# Patient Record
Sex: MALE | Race: OTHER | NOT HISPANIC OR LATINO | URBAN - METROPOLITAN AREA
[De-identification: names, ages, dates, MRNs, and addresses within clinical notes are randomized per-mention and may not be internally consistent; named-entity substitution may affect disease eponyms.]

---

## 2017-02-27 ENCOUNTER — EMERGENCY (EMERGENCY)
Facility: HOSPITAL | Age: 62
LOS: 1 days | Discharge: HOME | End: 2017-02-27
Attending: EMERGENCY MEDICINE
Payer: COMMERCIAL

## 2017-02-27 VITALS
TEMPERATURE: 98 F | WEIGHT: 125 LBS | OXYGEN SATURATION: 96 % | DIASTOLIC BLOOD PRESSURE: 81 MMHG | RESPIRATION RATE: 24 BRPM | HEIGHT: 72 IN | HEART RATE: 83 BPM | SYSTOLIC BLOOD PRESSURE: 122 MMHG

## 2017-02-27 DIAGNOSIS — R51 HEADACHE: ICD-10-CM
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
## 2017-02-27 LAB
ALBUMIN SERPL ELPH-MCNC: 4.5 G/DL — SIGNIFICANT CHANGE UP (ref 3.3–5.2)
ALP SERPL-CCNC: 72 U/L — SIGNIFICANT CHANGE UP (ref 40–120)
ALT FLD-CCNC: 22 U/L — SIGNIFICANT CHANGE UP
ANION GAP SERPL CALC-SCNC: 13 MMOL/L — SIGNIFICANT CHANGE UP (ref 5–17)
APAP SERPL-MCNC: <7.5 UG/ML — LOW (ref 10–26)
APTT BLD: 32.8 SEC — SIGNIFICANT CHANGE UP (ref 27.5–37.4)
AST SERPL-CCNC: 21 U/L — SIGNIFICANT CHANGE UP
BASOPHILS # BLD AUTO: 0 K/UL — SIGNIFICANT CHANGE UP (ref 0–0.2)
BASOPHILS NFR BLD AUTO: 0.2 % — SIGNIFICANT CHANGE UP (ref 0–2)
BILIRUB SERPL-MCNC: 0.3 MG/DL — LOW (ref 0.4–2)
BLD GP AB SCN SERPL QL: SIGNIFICANT CHANGE UP
BUN SERPL-MCNC: 18 MG/DL — SIGNIFICANT CHANGE UP (ref 8–20)
CALCIUM SERPL-MCNC: 9.5 MG/DL — SIGNIFICANT CHANGE UP (ref 8.6–10.2)
CHLORIDE SERPL-SCNC: 104 MMOL/L — SIGNIFICANT CHANGE UP (ref 98–107)
CO2 SERPL-SCNC: 25 MMOL/L — SIGNIFICANT CHANGE UP (ref 22–29)
CREAT SERPL-MCNC: 0.92 MG/DL — SIGNIFICANT CHANGE UP (ref 0.5–1.3)
EOSINOPHIL # BLD AUTO: 0 K/UL — SIGNIFICANT CHANGE UP (ref 0–0.5)
EOSINOPHIL NFR BLD AUTO: 0.6 % — SIGNIFICANT CHANGE UP (ref 0–6)
ETHANOL SERPL-MCNC: <10 MG/DL — SIGNIFICANT CHANGE UP
GLUCOSE SERPL-MCNC: 142 MG/DL — HIGH (ref 70–115)
HCT VFR BLD CALC: 40 % — LOW (ref 42–52)
HGB BLD-MCNC: 14 G/DL — SIGNIFICANT CHANGE UP (ref 14–18)
INR BLD: 1.04 RATIO — SIGNIFICANT CHANGE UP (ref 0.88–1.16)
LYMPHOCYTES # BLD AUTO: 1.6 K/UL — SIGNIFICANT CHANGE UP (ref 1–4.8)
LYMPHOCYTES # BLD AUTO: 20.1 % — SIGNIFICANT CHANGE UP (ref 20–55)
MCHC RBC-ENTMCNC: 32.6 PG — HIGH (ref 27–31)
MCHC RBC-ENTMCNC: 35 G/DL — SIGNIFICANT CHANGE UP (ref 32–36)
MCV RBC AUTO: 93 FL — SIGNIFICANT CHANGE UP (ref 80–94)
MONOCYTES # BLD AUTO: 0.4 K/UL — SIGNIFICANT CHANGE UP (ref 0–0.8)
MONOCYTES NFR BLD AUTO: 5.3 % — SIGNIFICANT CHANGE UP (ref 3–10)
NEUTROPHILS # BLD AUTO: 5.9 K/UL — SIGNIFICANT CHANGE UP (ref 1.8–8)
NEUTROPHILS NFR BLD AUTO: 73.4 % — HIGH (ref 37–73)
PLATELET # BLD AUTO: 150 K/UL — SIGNIFICANT CHANGE UP (ref 150–400)
POTASSIUM SERPL-MCNC: 3.6 MMOL/L — SIGNIFICANT CHANGE UP (ref 3.5–5.3)
POTASSIUM SERPL-SCNC: 3.6 MMOL/L — SIGNIFICANT CHANGE UP (ref 3.5–5.3)
PROT SERPL-MCNC: 7.3 G/DL — SIGNIFICANT CHANGE UP (ref 6.6–8.7)
PROTHROM AB SERPL-ACNC: 11.4 SEC — SIGNIFICANT CHANGE UP (ref 10–13.1)
RBC # BLD: 4.3 M/UL — LOW (ref 4.6–6.2)
RBC # FLD: 15 % — SIGNIFICANT CHANGE UP (ref 11–15.6)
SALICYLATES SERPL-MCNC: <2 MG/DL — LOW (ref 10–20)
SODIUM SERPL-SCNC: 142 MMOL/L — SIGNIFICANT CHANGE UP (ref 135–145)
TYPE + AB SCN PNL BLD: SIGNIFICANT CHANGE UP
WBC # BLD: 8.1 K/UL — SIGNIFICANT CHANGE UP (ref 4.8–10.8)
WBC # FLD AUTO: 8.1 K/UL — SIGNIFICANT CHANGE UP (ref 4.8–10.8)

## 2017-02-27 PROCEDURE — 85730 THROMBOPLASTIN TIME PARTIAL: CPT

## 2017-02-27 PROCEDURE — 80307 DRUG TEST PRSMV CHEM ANLYZR: CPT

## 2017-02-27 PROCEDURE — 86901 BLOOD TYPING SEROLOGIC RH(D): CPT

## 2017-02-27 PROCEDURE — 71260 CT THORAX DX C+: CPT | Mod: 26

## 2017-02-27 PROCEDURE — 72125 CT NECK SPINE W/O DYE: CPT

## 2017-02-27 PROCEDURE — 99284 EMERGENCY DEPT VISIT MOD MDM: CPT | Mod: 25

## 2017-02-27 PROCEDURE — 74177 CT ABD & PELVIS W/CONTRAST: CPT

## 2017-02-27 PROCEDURE — 86850 RBC ANTIBODY SCREEN: CPT

## 2017-02-27 PROCEDURE — 85027 COMPLETE CBC AUTOMATED: CPT

## 2017-02-27 PROCEDURE — 86900 BLOOD TYPING SEROLOGIC ABO: CPT

## 2017-02-27 PROCEDURE — 72125 CT NECK SPINE W/O DYE: CPT | Mod: 26

## 2017-02-27 PROCEDURE — 70450 CT HEAD/BRAIN W/O DYE: CPT | Mod: 26

## 2017-02-27 PROCEDURE — 85610 PROTHROMBIN TIME: CPT

## 2017-02-27 PROCEDURE — 99284 EMERGENCY DEPT VISIT MOD MDM: CPT

## 2017-02-27 PROCEDURE — 80053 COMPREHEN METABOLIC PANEL: CPT

## 2017-02-27 PROCEDURE — 74177 CT ABD & PELVIS W/CONTRAST: CPT | Mod: 26

## 2017-02-27 PROCEDURE — 70450 CT HEAD/BRAIN W/O DYE: CPT

## 2017-02-27 PROCEDURE — 71260 CT THORAX DX C+: CPT

## 2017-02-27 NOTE — ED STATDOCS - NS ED MD SCRIBE ATTENDING SCRIBE SECTIONS
DISPOSITION/HISTORY OF PRESENT ILLNESS/REVIEW OF SYSTEMS/VITAL SIGNS( Pullset)/PHYSICAL EXAM/PAST MEDICAL/SURGICAL/SOCIAL HISTORY/HIV
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
## 2017-02-27 NOTE — ED STATDOCS - MUSCULOSKELETAL, MLM
range of motion is not limited and there is no muscle tenderness. Mild swelling of R patella area, able to ambulate
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
## 2017-02-27 NOTE — ED PROVIDER NOTE - MEDICAL DECISION MAKING DETAILS
brother picking pt up his agitation is improved brother states he is always like this return to ed for intractable HA persitent vomiting or new onset motor or senory deficits. pt aware of need for f/u ct abdomen incidental findigns
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
## 2017-02-27 NOTE — ED PROVIDER NOTE - CONSTITUTIONAL, MLM
normal... anxious, awake, alert, oriented to person, place, time/situation and in no apparent distress.
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
## 2017-02-27 NOTE — ED PROVIDER NOTE - PHYSICAL EXAMINATION
neuro: CN II - XII intact, EOMI, PERRL, no papilledema, 5/5 muscle strength x 4 extremities, no sensory deficits, 2+ dtr globally, negative babinski, no ataxic gait, normal RACHEL and FNT, normal romberg
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
## 2020-01-13 NOTE — ED STATDOCS - ATTESTATION, MLM
Faxed to [facility] at [phone] with confirmation. I have reviewed and confirmed nurses' notes for patient's medications, allergies, medical history, and surgical history.

## 2020-05-19 PROBLEM — Z00.00 ENCOUNTER FOR PREVENTIVE HEALTH EXAMINATION: Status: ACTIVE | Noted: 2020-05-19

## 2020-05-21 ENCOUNTER — APPOINTMENT (OUTPATIENT)
Dept: UROLOGY | Facility: CLINIC | Age: 65
End: 2020-05-21
Payer: MEDICARE

## 2020-05-21 VITALS
SYSTOLIC BLOOD PRESSURE: 125 MMHG | TEMPERATURE: 97.7 F | HEIGHT: 72 IN | WEIGHT: 130 LBS | DIASTOLIC BLOOD PRESSURE: 84 MMHG | BODY MASS INDEX: 17.61 KG/M2 | HEART RATE: 85 BPM

## 2020-05-21 DIAGNOSIS — A63.0 ANOGENITAL (VENEREAL) WARTS: ICD-10-CM

## 2020-05-21 LAB
BILIRUB UR QL STRIP: NORMAL
CLARITY UR: CLEAR
COLLECTION METHOD: NORMAL
GLUCOSE UR-MCNC: NORMAL
HCG UR QL: 0.2 EU/DL
HGB UR QL STRIP.AUTO: NORMAL
KETONES UR-MCNC: NORMAL
LEUKOCYTE ESTERASE UR QL STRIP: NORMAL
NITRITE UR QL STRIP: NORMAL
PH UR STRIP: 5.5
PROT UR STRIP-MCNC: NORMAL
SP GR UR STRIP: 1.01

## 2020-05-21 PROCEDURE — 81003 URINALYSIS AUTO W/O SCOPE: CPT | Mod: QW

## 2020-05-21 PROCEDURE — 99202 OFFICE O/P NEW SF 15 MIN: CPT | Mod: 25

## 2020-05-21 NOTE — REVIEW OF SYSTEMS
[Shortness Of Breath] : shortness of breath [Difficulty Breathing] : dyspnea [Negative] : Heme/Lymph

## 2020-05-26 NOTE — HISTORY OF PRESENT ILLNESS
[FreeTextEntry1] : referred for lesion on penis.  Voids well, Has for "long time" . Not sexually active at present. [None] : no symptoms

## 2020-05-26 NOTE — PHYSICAL EXAM
[General Appearance - Well Nourished] : well nourished [General Appearance - Well Developed] : well developed [FreeTextEntry1] : small pedunculated lesion mid shaft lateral right [General Appearance - In No Acute Distress] : no acute distress [Oriented To Time, Place, And Person] : oriented to person, place, and time

## 2020-05-26 NOTE — ASSESSMENT
[FreeTextEntry1] : Condyloma accuminata, options discussed.  Will try condylox, instructions/ precautions reviewed.

## 2020-07-14 ENCOUNTER — APPOINTMENT (OUTPATIENT)
Dept: UROLOGY | Facility: CLINIC | Age: 65
End: 2020-07-14
Payer: MEDICARE

## 2020-07-14 VITALS — SYSTOLIC BLOOD PRESSURE: 109 MMHG | HEART RATE: 106 BPM | TEMPERATURE: 98 F | DIASTOLIC BLOOD PRESSURE: 74 MMHG

## 2020-07-14 PROCEDURE — 99213 OFFICE O/P EST LOW 20 MIN: CPT | Mod: 25

## 2020-07-14 PROCEDURE — 81003 URINALYSIS AUTO W/O SCOPE: CPT | Mod: QW

## 2020-07-14 NOTE — PHYSICAL EXAM
[General Appearance - Well Developed] : well developed [Normal Appearance] : normal appearance [Well Groomed] : well groomed [General Appearance - Well Nourished] : well nourished [General Appearance - In No Acute Distress] : no acute distress [Urethral Meatus] : meatus normal [Skin Color & Pigmentation] : normal skin color and pigmentation [] : no rash [Affect] : the affect was normal [Mood] : the mood was normal [Oriented To Time, Place, And Person] : oriented to person, place, and time [FreeTextEntry1] : uses cane [Not Anxious] : not anxious [Normal Station and Gait] : the gait and station were normal for the patient's age

## 2020-07-16 LAB
BILIRUB UR QL STRIP: NORMAL
CLARITY UR: CLEAR
COLLECTION METHOD: NORMAL
GLUCOSE UR-MCNC: NORMAL
HCG UR QL: 0.2 EU/DL
HGB UR QL STRIP.AUTO: NORMAL
KETONES UR-MCNC: NORMAL
LEUKOCYTE ESTERASE UR QL STRIP: NORMAL
NITRITE UR QL STRIP: NORMAL
PH UR STRIP: 5.5
PROT UR STRIP-MCNC: NORMAL
SP GR UR STRIP: 1.02

## 2020-11-03 ENCOUNTER — EMERGENCY (EMERGENCY)
Facility: HOSPITAL | Age: 65
LOS: 1 days | Discharge: HOME | End: 2020-11-03
Attending: STUDENT IN AN ORGANIZED HEALTH CARE EDUCATION/TRAINING PROGRAM
Payer: MEDICARE

## 2020-11-03 VITALS
RESPIRATION RATE: 18 BRPM | OXYGEN SATURATION: 98 % | SYSTOLIC BLOOD PRESSURE: 152 MMHG | HEART RATE: 73 BPM | DIASTOLIC BLOOD PRESSURE: 100 MMHG

## 2020-11-03 VITALS
TEMPERATURE: 99 F | SYSTOLIC BLOOD PRESSURE: 142 MMHG | HEART RATE: 96 BPM | DIASTOLIC BLOOD PRESSURE: 90 MMHG | WEIGHT: 139.99 LBS | HEIGHT: 72 IN | OXYGEN SATURATION: 98 % | RESPIRATION RATE: 18 BRPM

## 2020-11-03 LAB
ALBUMIN SERPL ELPH-MCNC: 4.2 G/DL — SIGNIFICANT CHANGE UP (ref 3.3–5.2)
ALP SERPL-CCNC: 90 U/L — SIGNIFICANT CHANGE UP (ref 40–120)
ALT FLD-CCNC: 25 U/L — SIGNIFICANT CHANGE UP
ANION GAP SERPL CALC-SCNC: 11 MMOL/L — SIGNIFICANT CHANGE UP (ref 5–17)
AST SERPL-CCNC: 25 U/L — SIGNIFICANT CHANGE UP
BASOPHILS # BLD AUTO: 0 K/UL — SIGNIFICANT CHANGE UP (ref 0–0.2)
BASOPHILS NFR BLD AUTO: 0 % — SIGNIFICANT CHANGE UP (ref 0–2)
BILIRUB SERPL-MCNC: 0.3 MG/DL — LOW (ref 0.4–2)
BUN SERPL-MCNC: 19 MG/DL — SIGNIFICANT CHANGE UP (ref 8–20)
CALCIUM SERPL-MCNC: 9.5 MG/DL — SIGNIFICANT CHANGE UP (ref 8.6–10.2)
CHLORIDE SERPL-SCNC: 104 MMOL/L — SIGNIFICANT CHANGE UP (ref 98–107)
CO2 SERPL-SCNC: 27 MMOL/L — SIGNIFICANT CHANGE UP (ref 22–29)
CREAT SERPL-MCNC: 0.81 MG/DL — SIGNIFICANT CHANGE UP (ref 0.5–1.3)
EOSINOPHIL # BLD AUTO: 0.11 K/UL — SIGNIFICANT CHANGE UP (ref 0–0.5)
EOSINOPHIL NFR BLD AUTO: 1.8 % — SIGNIFICANT CHANGE UP (ref 0–6)
GLUCOSE SERPL-MCNC: 85 MG/DL — SIGNIFICANT CHANGE UP (ref 70–99)
HCT VFR BLD CALC: 42.7 % — SIGNIFICANT CHANGE UP (ref 39–50)
HGB BLD-MCNC: 14.3 G/DL — SIGNIFICANT CHANGE UP (ref 13–17)
LYMPHOCYTES # BLD AUTO: 1.79 K/UL — SIGNIFICANT CHANGE UP (ref 1–3.3)
LYMPHOCYTES # BLD AUTO: 28.3 % — SIGNIFICANT CHANGE UP (ref 13–44)
MAGNESIUM SERPL-MCNC: 2.2 MG/DL — SIGNIFICANT CHANGE UP (ref 1.6–2.6)
MCHC RBC-ENTMCNC: 31.5 PG — SIGNIFICANT CHANGE UP (ref 27–34)
MCHC RBC-ENTMCNC: 33.5 GM/DL — SIGNIFICANT CHANGE UP (ref 32–36)
MCV RBC AUTO: 94.1 FL — SIGNIFICANT CHANGE UP (ref 80–100)
MONOCYTES # BLD AUTO: 0.5 K/UL — SIGNIFICANT CHANGE UP (ref 0–0.9)
MONOCYTES NFR BLD AUTO: 8 % — SIGNIFICANT CHANGE UP (ref 2–14)
NEUTROPHILS # BLD AUTO: 3.63 K/UL — SIGNIFICANT CHANGE UP (ref 1.8–7.4)
NEUTROPHILS NFR BLD AUTO: 57.5 % — SIGNIFICANT CHANGE UP (ref 43–77)
NT-PROBNP SERPL-SCNC: 45 PG/ML — SIGNIFICANT CHANGE UP (ref 0–300)
PLATELET # BLD AUTO: 63 K/UL — LOW (ref 150–400)
POTASSIUM SERPL-MCNC: 4.4 MMOL/L — SIGNIFICANT CHANGE UP (ref 3.5–5.3)
POTASSIUM SERPL-SCNC: 4.4 MMOL/L — SIGNIFICANT CHANGE UP (ref 3.5–5.3)
PROT SERPL-MCNC: 6.6 G/DL — SIGNIFICANT CHANGE UP (ref 6.6–8.7)
RBC # BLD: 4.54 M/UL — SIGNIFICANT CHANGE UP (ref 4.2–5.8)
RBC # FLD: 13.5 % — SIGNIFICANT CHANGE UP (ref 10.3–14.5)
SARS-COV-2 RNA SPEC QL NAA+PROBE: SIGNIFICANT CHANGE UP
SODIUM SERPL-SCNC: 142 MMOL/L — SIGNIFICANT CHANGE UP (ref 135–145)
TROPONIN T SERPL-MCNC: <0.01 NG/ML — SIGNIFICANT CHANGE UP (ref 0–0.06)
WBC # BLD: 6.31 K/UL — SIGNIFICANT CHANGE UP (ref 3.8–10.5)
WBC # FLD AUTO: 6.31 K/UL — SIGNIFICANT CHANGE UP (ref 3.8–10.5)

## 2020-11-03 PROCEDURE — 71045 X-RAY EXAM CHEST 1 VIEW: CPT | Mod: 26

## 2020-11-03 PROCEDURE — 83735 ASSAY OF MAGNESIUM: CPT

## 2020-11-03 PROCEDURE — 36415 COLL VENOUS BLD VENIPUNCTURE: CPT

## 2020-11-03 PROCEDURE — 85025 COMPLETE CBC W/AUTO DIFF WBC: CPT

## 2020-11-03 PROCEDURE — 71045 X-RAY EXAM CHEST 1 VIEW: CPT

## 2020-11-03 PROCEDURE — 93010 ELECTROCARDIOGRAM REPORT: CPT

## 2020-11-03 PROCEDURE — 84484 ASSAY OF TROPONIN QUANT: CPT

## 2020-11-03 PROCEDURE — 99285 EMERGENCY DEPT VISIT HI MDM: CPT

## 2020-11-03 PROCEDURE — 96374 THER/PROPH/DIAG INJ IV PUSH: CPT

## 2020-11-03 PROCEDURE — U0003: CPT

## 2020-11-03 PROCEDURE — 83880 ASSAY OF NATRIURETIC PEPTIDE: CPT

## 2020-11-03 PROCEDURE — 80053 COMPREHEN METABOLIC PANEL: CPT

## 2020-11-03 PROCEDURE — 93005 ELECTROCARDIOGRAM TRACING: CPT

## 2020-11-03 PROCEDURE — 99284 EMERGENCY DEPT VISIT MOD MDM: CPT | Mod: 25

## 2020-11-03 RX ORDER — FAMOTIDINE 10 MG/ML
20 INJECTION INTRAVENOUS ONCE
Refills: 0 | Status: COMPLETED | OUTPATIENT
Start: 2020-11-03 | End: 2020-11-03

## 2020-11-03 RX ORDER — LIDOCAINE 4 G/100G
10 CREAM TOPICAL ONCE
Refills: 0 | Status: COMPLETED | OUTPATIENT
Start: 2020-11-03 | End: 2020-11-03

## 2020-11-03 RX ADMIN — LIDOCAINE 10 MILLILITER(S): 4 CREAM TOPICAL at 18:54

## 2020-11-03 RX ADMIN — FAMOTIDINE 20 MILLIGRAM(S): 10 INJECTION INTRAVENOUS at 18:54

## 2020-11-03 RX ADMIN — Medication 30 MILLILITER(S): at 18:54

## 2020-11-03 NOTE — ED PROVIDER NOTE - ATTENDING CONTRIBUTION TO CARE
65 YOM pmh COPD presents c/o chest pain this morning.  Pt states that he had a brief episode of substernal stabbing chest pain this morning that resolved. Denies f/c, n/v, headache, sob, abd pain, constipation. Denies cardiac disease. In the process of switching doctors as patient just moved here.  AP - heart score 3 pre troponin. abd soft nontender nondistended. likely close outpatient f/u with cardiology

## 2020-11-03 NOTE — ED ADULT NURSE NOTE - INTERVENTIONS DEFINITIONS
Non-slip footwear when patient is off stretcher/Physically safe environment: no spills, clutter or unnecessary equipment/Monitor for mental status changes and reorient to person, place, and time/Instruct patient to call for assistance/Stretcher in lowest position, wheels locked, appropriate side rails in place/Monitor gait and stability/Review medications for side effects contributing to fall risk/Reinforce activity limits and safety measures with patient and family

## 2020-11-03 NOTE — ED PROVIDER NOTE - NS ED ROS FT
CONSTITUTIONAL: No fevers, no chills  Eyes: No vision changes  Cardiovascular: +Chest pain  Respiratory: No SOB  Gastrointestinal: +nausea, no v/c/d, +abd pain  Genitourinary: no dysuria, no hematuria  SKIN: no rashes.  MSK: no weakness, no myalgias, no arthralgias  NEURO: no headache, no weakness, no numbness  PSYCHIATRIC: no SI/HI

## 2020-11-03 NOTE — ED PROVIDER NOTE - PATIENT PORTAL LINK FT
You can access the FollowMyHealth Patient Portal offered by [facility] by registering at the following website: [email or web address]. By joining [employer]’s FollowMyHealth portal, you will also be able to view your health information using other applications (apps) compatible with our system.

## 2020-11-03 NOTE — ED PROVIDER NOTE - NSFOLLOWUPINSTRUCTIONS_ED_ALL_ED_FT
-- Please follow up with your doctor(s) within the next 3 days, but seek medical attention sooner if your symptoms persist or worsen.  Please call tomorrow for an appointment.  If you cannot follow-up with your primary doctor please return to the ED for any urgent issues.    -- You were given a copy of your labs and imaging.  Please go over these with your doctor(s).     -- If you have any worsening of symptoms or any other concerns please see your doctor or return to the nearest emergency room immediately.    -- Please continue taking your home medications as directed.  Do not use alcohol when taking any medication (especially antibiotics, tylenol or other pain medication) unless you check with the doctor or pharmacist.    **************************************************************************************************************  Chest Pain    Chest pain can be caused by many different conditions which may or may not be dangerous. Causes include heartburn, lung infections, heart attack, blood clot in lungs, skin infections, strain or damage to muscle, cartilage, or bones, etc. In addition to a history and physical examination, an electrocardiogram (ECG) or other lab tests may have been performed to determine the cause of your chest pain. Follow up with your primary care provider or with a cardiologist as instructed.     SEEK IMMEDIATE MEDICAL CARE IF YOU HAVE ANY OF THE FOLLOWING SYMPTOMS: worsening chest pain, coughing up blood, unexplained back/neck/jaw pain, severe abdominal pain, dizziness or lightheadedness, fainting, shortness of breath, sweaty or clammy skin, vomiting, or racing heart beat. These symptoms may represent a serious problem that is an emergency. Do not wait to see if the symptoms will go away. Get medical help right away. Call 911 and do not drive yourself to the hospital.

## 2020-11-03 NOTE — ED PROVIDER NOTE - OBJECTIVE STATEMENT
Pt is a 66 y/o M w/PMHx COPD presents c/o chest pain.  Pt states that he had a brief episode of substernal stabbing chest pain this morning that resolved.  He went to vote, and had a return of the chest pain.  He has had this pain in the past with an unremarkable w/u.  Pt states he had some diarrhea that has resolved, but still has some epigastric discomfort with nausea.  Denies shortness of breath, fever, headache

## 2020-11-03 NOTE — ED PROVIDER NOTE - CARE PROVIDER_API CALL
Melecio Garibay  CARDIOVASCULAR DISEASE  [address], [place] [address]  Phone: [phone]  Fax: [phone]  Follow Up Time:

## 2020-11-05 PROBLEM — J44.9 CHRONIC OBSTRUCTIVE PULMONARY DISEASE, UNSPECIFIED: Chronic | Status: ACTIVE | Noted: 2020-11-03

## 2020-11-05 PROBLEM — M54.9 DORSALGIA, UNSPECIFIED: Chronic | Status: ACTIVE | Noted: 2020-11-03

## 2020-11-30 DIAGNOSIS — Z87.898 PERSONAL HISTORY OF OTHER SPECIFIED CONDITIONS: ICD-10-CM

## 2020-11-30 DIAGNOSIS — Z87.39 PERSONAL HISTORY OF OTHER DISEASES OF THE MUSCULOSKELETAL SYSTEM AND CONNECTIVE TISSUE: ICD-10-CM

## 2020-11-30 DIAGNOSIS — Z87.891 PERSONAL HISTORY OF NICOTINE DEPENDENCE: ICD-10-CM

## 2020-11-30 DIAGNOSIS — Z87.09 PERSONAL HISTORY OF OTHER DISEASES OF THE RESPIRATORY SYSTEM: ICD-10-CM

## 2020-12-01 ENCOUNTER — APPOINTMENT (OUTPATIENT)
Dept: CARDIOLOGY | Facility: CLINIC | Age: 65
End: 2020-12-01
Payer: MEDICARE

## 2020-12-01 VITALS — DIASTOLIC BLOOD PRESSURE: 74 MMHG | SYSTOLIC BLOOD PRESSURE: 111 MMHG

## 2020-12-01 VITALS
OXYGEN SATURATION: 96 % | SYSTOLIC BLOOD PRESSURE: 107 MMHG | DIASTOLIC BLOOD PRESSURE: 71 MMHG | HEART RATE: 86 BPM | TEMPERATURE: 98.5 F

## 2020-12-01 DIAGNOSIS — Z72.0 TOBACCO USE: ICD-10-CM

## 2020-12-01 PROCEDURE — 99203 OFFICE O/P NEW LOW 30 MIN: CPT

## 2020-12-01 PROCEDURE — 99072 ADDL SUPL MATRL&STAF TM PHE: CPT

## 2020-12-01 RX ORDER — PODOFILOX 5 MG/G
0.5 GEL TOPICAL TWICE DAILY
Qty: 1 | Refills: 1 | Status: DISCONTINUED | COMMUNITY
Start: 2020-05-21 | End: 2020-12-01

## 2020-12-01 RX ORDER — ALBUTEROL SULFATE 2.5 MG/3ML
(2.5 MG/3ML) SOLUTION RESPIRATORY (INHALATION)
Refills: 3 | Status: ACTIVE | COMMUNITY
Start: 2020-12-01

## 2020-12-01 RX ORDER — ASCORBIC ACID 500 MG
TABLET ORAL DAILY
Refills: 0 | Status: ACTIVE | COMMUNITY
Start: 2020-12-01

## 2020-12-01 RX ORDER — PODOFILOX 5 MG/ML
0.5 SOLUTION TOPICAL TWICE DAILY
Qty: 1 | Refills: 1 | Status: DISCONTINUED | COMMUNITY
Start: 2020-05-21 | End: 2020-12-01

## 2020-12-01 NOTE — HISTORY OF PRESENT ILLNESS
[FreeTextEntry1] : 66 yo man, single, no children, doesn’t work, retired used to work with disabled patients. Since 2017 has been C/O chest pain, intermittent, not effort related, pressure like, mid sternal, sharp, may last hours, does not radiate, not associated with SOB, palpitations or dizziness. Never effort related. Denies SOB, orthopnea or PND. Denies palpitations or ankle swelling.\par On 11/3/2020 he presented to [facility] with chest pain. Underwent tests, cardiac troponins and ECG were WNL and was D/[facility]ario [facility]to home. \par Hyperlipidemia not treated\par COPD treated with meds\par Underwent Rt knee surgery in 2014\par Smokes 5 cigs/day, smoked a pack/day until 2017\par Doesnt drink alcohol\par Denies the use of illicit drugs\par No family history of heart disease\par

## 2020-12-01 NOTE — PHYSICAL EXAM
[General Appearance - Well Developed] : well developed [Normal Appearance] : normal appearance [Well Groomed] : well groomed [General Appearance - Well Nourished] : well nourished [No Deformities] : no deformities [Normal Conjunctiva] : the conjunctiva exhibited no abnormalities [Normal Jugular Venous V Waves Present] : normal jugular venous V waves present [Heart Rate And Rhythm] : heart rate and rhythm were normal [Heart Sounds] : normal S1 and S2 [Murmurs] : no murmurs present [Arterial Pulses Normal] : the arterial pulses were normal [Edema] : no peripheral edema present [Veins - Varicosity Changes] : no varicosital changes were noted in the lower extremities [Respiration, Rhythm And Depth] : normal respiratory rhythm and effort [Exaggerated Use Of Accessory Muscles For Inspiration] : no accessory muscle use [Auscultation Breath Sounds / Voice Sounds] : lungs were clear to auscultation bilaterally [Chest Palpation] : palpation of the chest revealed no abnormalities [Lungs Percussion] : the lungs were normal to percussion [Bowel Sounds] : normal bowel sounds [Abdomen Soft] : soft [Abdomen Tenderness] : non-tender [Abdomen Mass (___ Cm)] : no abdominal mass palpated [Abdomen Hernia] : no hernia was discovered [Abnormal Walk] : normal gait [Nail Clubbing] : no clubbing of the fingernails [Cyanosis, Localized] : no localized cyanosis [Skin Color & Pigmentation] : normal skin color and pigmentation [Skin Turgor] : normal skin turgor [] : no rash [Oriented To Time, Place, And Person] : oriented to person, place, and time [Impaired Insight] : insight and judgment were intact [No Anxiety] : not feeling anxious [FreeTextEntry1] : Deferred for COVID

## 2020-12-01 NOTE — ASSESSMENT
[FreeTextEntry1] : ECG performed Nov 3, 2020 revealed a NSR, with normal AQRS, DC, QRS and QTc.\par

## 2020-12-01 NOTE — REVIEW OF SYSTEMS
[Chest Pain] : chest pain [Negative] : Heme/Lymph [Dyspnea on exertion] : not dyspnea during exertion [Chest  Pressure] : no chest pressure [Lower Ext Edema] : no extremity edema [Palpitations] : no palpitations

## 2020-12-01 NOTE — DISCUSSION/SUMMARY
[FreeTextEntry1] : Mr. JACOBO CRAMER is a 65 year male with atypical chest pain and recent visit to [facility] ED. Troponins and ECG were WNL. \par I have recommended to continue the same medications.\par We will perform a nuclear stress test and an echocardiogram to assess for ischemia, left ventricular and valvular function.\par Routine follow up in 1 month\par

## 2021-01-14 ENCOUNTER — APPOINTMENT (OUTPATIENT)
Dept: CARDIOLOGY | Facility: CLINIC | Age: 66
End: 2021-01-14
Payer: MEDICARE

## 2021-01-14 PROCEDURE — 99072 ADDL SUPL MATRL&STAF TM PHE: CPT

## 2021-01-14 PROCEDURE — 93306 TTE W/DOPPLER COMPLETE: CPT

## 2021-02-02 ENCOUNTER — APPOINTMENT (OUTPATIENT)
Dept: CARDIOLOGY | Facility: CLINIC | Age: 66
End: 2021-02-02

## 2021-03-04 ENCOUNTER — APPOINTMENT (OUTPATIENT)
Dept: CARDIOLOGY | Facility: CLINIC | Age: 66
End: 2021-03-04
Payer: MEDICARE

## 2021-03-04 PROCEDURE — 99072 ADDL SUPL MATRL&STAF TM PHE: CPT

## 2021-03-04 PROCEDURE — 93015 CV STRESS TEST SUPVJ I&R: CPT

## 2021-03-04 PROCEDURE — A9500: CPT

## 2021-03-04 PROCEDURE — 78452 HT MUSCLE IMAGE SPECT MULT: CPT

## 2021-03-10 ENCOUNTER — APPOINTMENT (OUTPATIENT)
Dept: CARDIOLOGY | Facility: CLINIC | Age: 66
End: 2021-03-10
Payer: MEDICARE

## 2021-03-10 VITALS
HEART RATE: 77 BPM | DIASTOLIC BLOOD PRESSURE: 86 MMHG | WEIGHT: 138 LBS | SYSTOLIC BLOOD PRESSURE: 128 MMHG | HEIGHT: 72 IN | OXYGEN SATURATION: 98 % | BODY MASS INDEX: 18.69 KG/M2 | TEMPERATURE: 98.1 F

## 2021-03-10 DIAGNOSIS — K21.9 GASTRO-ESOPHAGEAL REFLUX DISEASE W/OUT ESOPHAGITIS: ICD-10-CM

## 2021-03-10 PROCEDURE — 99212 OFFICE O/P EST SF 10 MIN: CPT

## 2021-03-10 PROCEDURE — 99072 ADDL SUPL MATRL&STAF TM PHE: CPT

## 2021-03-10 NOTE — PHYSICAL EXAM
[General Appearance - Well Developed] : well developed [Normal Appearance] : normal appearance [Well Groomed] : well groomed [General Appearance - Well Nourished] : well nourished [No Deformities] : no deformities [Normal Conjunctiva] : the conjunctiva exhibited no abnormalities [Normal Jugular Venous V Waves Present] : normal jugular venous V waves present [Respiration, Rhythm And Depth] : normal respiratory rhythm and effort [Exaggerated Use Of Accessory Muscles For Inspiration] : no accessory muscle use [Auscultation Breath Sounds / Voice Sounds] : lungs were clear to auscultation bilaterally [Chest Palpation] : palpation of the chest revealed no abnormalities [Lungs Percussion] : the lungs were normal to percussion [Heart Rate And Rhythm] : heart rate and rhythm were normal [Heart Sounds] : normal S1 and S2 [Murmurs] : no murmurs present [Arterial Pulses Normal] : the arterial pulses were normal [Edema] : no peripheral edema present [Veins - Varicosity Changes] : no varicosital changes were noted in the lower extremities [Bowel Sounds] : normal bowel sounds [Abdomen Soft] : soft [Abdomen Tenderness] : non-tender [Abdomen Mass (___ Cm)] : no abdominal mass palpated [Abdomen Hernia] : no hernia was discovered [Abnormal Walk] : normal gait [Nail Clubbing] : no clubbing of the fingernails [Cyanosis, Localized] : no localized cyanosis [Skin Color & Pigmentation] : normal skin color and pigmentation [Skin Turgor] : normal skin turgor [] : no rash [Oriented To Time, Place, And Person] : oriented to person, place, and time [Impaired Insight] : insight and judgment were intact [No Anxiety] : not feeling anxious [FreeTextEntry1] : Deferred for COVID

## 2021-03-10 NOTE — HISTORY OF PRESENT ILLNESS
[FreeTextEntry1] : 64 yo man, single, no children, doesn’t work, retired used to work with disabled patients. Since 2017 has been C/O chest pain, intermittent, not effort related, pressure like, mid sternal, sharp, may last hours, does not radiate, not associated with SOB, palpitations or dizziness. Never effort related. Denies SOB, orthopnea or PND. Denies palpitations or ankle swelling.\par On 11/3/2020 he presented to [facility] with chest pain. Underwent tests, cardiac troponins and ECG were WNL and was D/[facility] home. \par Since that visit he has been asymptomatic and at the present time he denies chest pain or SOB. \par Underwent nuclear stress test in 3/4/2021 that revealed normal perfusion and normal EF.\par Echocardiogram performed 1/14/2021 was also WNL. \par Hyperlipidemia not treated\par COPD treated with meds\par Underwent Rt knee surgery in 2014\par Smokes 5 cigs/day, smoked a pack/day until 2017\par Doesnt drink alcohol\par Denies the use of illicit drugs\par No family history of heart disease\par

## 2021-03-10 NOTE — REVIEW OF SYSTEMS
[Negative] : Heme/Lymph [Dyspnea on exertion] : not dyspnea during exertion [Chest  Pressure] : no chest pressure [Chest Pain] : no chest pain [Lower Ext Edema] : no extremity edema [Palpitations] : no palpitations

## 2021-03-10 NOTE — ASSESSMENT
[FreeTextEntry1] : ECG performed Nov 3, 2020 revealed a NSR, with normal AQRS, AL, QRS and QTc.\par

## 2021-03-10 NOTE — DISCUSSION/SUMMARY
[FreeTextEntry1] : Mr. JACOBO CRAMER is a 65 year male with atypical chest pain and recent visit to [facility] ED. Troponins and ECG were WNL. Echo and NST were WNL. \par Since then he has been asymptomatic.\par Routine follow up in 6 months\par

## 2021-09-15 ENCOUNTER — APPOINTMENT (OUTPATIENT)
Dept: CARDIOLOGY | Facility: CLINIC | Age: 66
End: 2021-09-15

## 2021-10-07 ENCOUNTER — OUTPATIENT (OUTPATIENT)
Dept: OUTPATIENT SERVICES | Facility: HOSPITAL | Age: 66
LOS: 1 days | Discharge: HOME | End: 2021-10-07

## 2021-10-07 DIAGNOSIS — D69.6 THROMBOCYTOPENIA, UNSPECIFIED: ICD-10-CM

## 2021-10-08 ENCOUNTER — RESULT REVIEW (OUTPATIENT)
Age: 66
End: 2021-10-08

## 2021-10-08 ENCOUNTER — APPOINTMENT (OUTPATIENT)
Dept: HEMATOLOGY ONCOLOGY | Facility: CLINIC | Age: 66
End: 2021-10-08
Payer: MEDICARE

## 2021-10-08 VITALS
HEART RATE: 81 BPM | HEIGHT: 72 IN | BODY MASS INDEX: 18.56 KG/M2 | OXYGEN SATURATION: 98 % | SYSTOLIC BLOOD PRESSURE: 113 MMHG | DIASTOLIC BLOOD PRESSURE: 82 MMHG | WEIGHT: 137.03 LBS

## 2021-10-08 DIAGNOSIS — F17.200 NICOTINE DEPENDENCE, UNSPECIFIED, UNCOMPLICATED: ICD-10-CM

## 2021-10-08 LAB
BASOPHILS # BLD AUTO: 0 K/UL — SIGNIFICANT CHANGE UP (ref 0–0.2)
BASOPHILS NFR BLD AUTO: 0.6 % — SIGNIFICANT CHANGE UP (ref 0–2)
EOSINOPHIL # BLD AUTO: 0.1 K/UL — SIGNIFICANT CHANGE UP (ref 0–0.5)
EOSINOPHIL NFR BLD AUTO: 0.9 % — SIGNIFICANT CHANGE UP (ref 0–6)
HCT VFR BLD CALC: 48.2 % — SIGNIFICANT CHANGE UP (ref 39–50)
HGB BLD-MCNC: 15.9 G/DL — SIGNIFICANT CHANGE UP (ref 13–17)
LYMPHOCYTES # BLD AUTO: 2 K/UL — SIGNIFICANT CHANGE UP (ref 1–3.3)
LYMPHOCYTES # BLD AUTO: 32 % — SIGNIFICANT CHANGE UP (ref 13–44)
MCHC RBC-ENTMCNC: 30.6 PG — SIGNIFICANT CHANGE UP (ref 27–34)
MCHC RBC-ENTMCNC: 32.9 G/DL — SIGNIFICANT CHANGE UP (ref 32–36)
MCV RBC AUTO: 93.1 FL — SIGNIFICANT CHANGE UP (ref 80–100)
MONOCYTES # BLD AUTO: 0.4 K/UL — SIGNIFICANT CHANGE UP (ref 0–0.9)
MONOCYTES NFR BLD AUTO: 6.8 % — SIGNIFICANT CHANGE UP (ref 2–14)
NEUTROPHILS # BLD AUTO: 3.8 K/UL — SIGNIFICANT CHANGE UP (ref 1.8–7.4)
NEUTROPHILS NFR BLD AUTO: 59.6 % — SIGNIFICANT CHANGE UP (ref 43–77)
PLATELET # BLD AUTO: 71 K/UL — LOW (ref 150–400)
RBC # BLD: 5.18 M/UL — SIGNIFICANT CHANGE UP (ref 4.2–5.8)
RBC # FLD: 12.6 % — SIGNIFICANT CHANGE UP (ref 10.3–14.5)
WBC # BLD: 6.3 K/UL — SIGNIFICANT CHANGE UP (ref 3.8–10.5)
WBC # FLD AUTO: 6.3 K/UL — SIGNIFICANT CHANGE UP (ref 3.8–10.5)

## 2021-10-08 PROCEDURE — 99203 OFFICE O/P NEW LOW 30 MIN: CPT

## 2021-10-08 PROCEDURE — 99406 BEHAV CHNG SMOKING 3-10 MIN: CPT

## 2021-10-08 NOTE — ASSESSMENT
[FreeTextEntry1] : 66 M here for evaluation of thrombocytopenia. Labs on 6/2021 showed low plt, increased liver enzyme.  Pt reports that his plts were low at least for the past 5 years ago. Never require treatment.\par 3/9/2021: plt 98, normal wbc, hgb,\par 5/6/2020; plt 130\par \par # Thrombocytopenia\par -likely ITP given the chronicity and normal WBC, Hb.\par -will r/o out causes: check hepatitis panel\par -PBS reviewed: normal RBC with appropriate central pallor, no schistocytes.Granulocytes, lymphocytes appear normal morphologically, no increased in immature cells, decreased platelets, no clumping \par -underlying hematologic cause is unlikely \par -if the above tests are normal, it is likely ITP and can monitor closely with q 3 months CBC \par -no need for intervention plt>50\par \par # Smoking Cessation\par -pt not ready to quit\par -counseled pt on reducing smoking \par \par \par Will call with results\par \par PCP Erwin Napier [phone]

## 2021-10-08 NOTE — HISTORY OF PRESENT ILLNESS
[de-identified] : 66 M here for evaluation of thrombocytopenia. Labs on 6/2021 showed low plt, increased liver enzyme.  Pt reports that his plts were low at least for the past 5 years ago. Never require treatment.\par 3/9/2021: plt 98, normal wbc, hgb,\par 5/6/2020; plt 130\par Pt is a current everday smoker, not ready to quit. Denies any hematologic disorder in the family. No easy bruising. Denies HA. CP, SOB, abd pain, constipation, diarrhea, melena, hematuria, dysuria. \par \par \par PCP Erwin Napier [phone]

## 2021-10-08 NOTE — CONSULT LETTER
[Dear  ___] : Dear  [unfilled], [Consult Letter:] : I had the pleasure of evaluating your patient, [unfilled]. [Consult Closing:] : Thank you very much for allowing me to participate in the care of this patient.  If you have any questions, please do not hesitate to contact me. [Sincerely,] : Sincerely, [FreeTextEntry3] : Arash Murphy

## 2021-10-13 LAB
FERRITIN SERPL-MCNC: 29 NG/ML
FOLATE SERPL-MCNC: >20 NG/ML
HAV IGM SER QL: NONREACTIVE
HBV CORE IGG+IGM SER QL: NONREACTIVE
HBV CORE IGM SER QL: NONREACTIVE
HBV SURFACE AB SER QL: NONREACTIVE
HBV SURFACE AG SER QL: NONREACTIVE
HCV AB SER QL: NONREACTIVE
HCV S/CO RATIO: 0.13 S/CO
IRON SATN MFR SERPL: 24 %
IRON SERPL-MCNC: 90 UG/DL
RBC # BLD: 5.02 M/UL
RETICS # AUTO: 1.1 %
RETICS AGGREG/RBC NFR: 55.2 K/UL
TIBC SERPL-MCNC: 370 UG/DL
UIBC SERPL-MCNC: 280 UG/DL
VIT B12 SERPL-MCNC: 655 PG/ML

## 2021-11-11 ENCOUNTER — OUTPATIENT (OUTPATIENT)
Dept: OUTPATIENT SERVICES | Facility: HOSPITAL | Age: 66
LOS: 1 days | Discharge: HOME | End: 2021-11-11

## 2021-11-11 DIAGNOSIS — D69.6 THROMBOCYTOPENIA, UNSPECIFIED: ICD-10-CM

## 2021-11-15 ENCOUNTER — RESULT REVIEW (OUTPATIENT)
Age: 66
End: 2021-11-15

## 2021-11-15 ENCOUNTER — APPOINTMENT (OUTPATIENT)
Dept: HEMATOLOGY ONCOLOGY | Facility: CLINIC | Age: 66
End: 2021-11-15
Payer: MEDICARE

## 2021-11-15 VITALS
OXYGEN SATURATION: 98 % | WEIGHT: 142.01 LBS | HEIGHT: 72 IN | DIASTOLIC BLOOD PRESSURE: 82 MMHG | BODY MASS INDEX: 19.23 KG/M2 | HEART RATE: 83 BPM | SYSTOLIC BLOOD PRESSURE: 117 MMHG

## 2021-11-15 LAB
BASOPHILS # BLD AUTO: 0 K/UL — SIGNIFICANT CHANGE UP (ref 0–0.2)
BASOPHILS NFR BLD AUTO: 0.8 % — SIGNIFICANT CHANGE UP (ref 0–2)
EOSINOPHIL # BLD AUTO: 0.2 K/UL — SIGNIFICANT CHANGE UP (ref 0–0.5)
EOSINOPHIL NFR BLD AUTO: 3.2 % — SIGNIFICANT CHANGE UP (ref 0–6)
HCT VFR BLD CALC: 48.9 % — SIGNIFICANT CHANGE UP (ref 39–50)
HGB BLD-MCNC: 15.6 G/DL — SIGNIFICANT CHANGE UP (ref 13–17)
LYMPHOCYTES # BLD AUTO: 1.8 K/UL — SIGNIFICANT CHANGE UP (ref 1–3.3)
LYMPHOCYTES # BLD AUTO: 33.2 % — SIGNIFICANT CHANGE UP (ref 13–44)
MCHC RBC-ENTMCNC: 31 PG — SIGNIFICANT CHANGE UP (ref 27–34)
MCHC RBC-ENTMCNC: 31.9 G/DL — LOW (ref 32–36)
MCV RBC AUTO: 97.2 FL — SIGNIFICANT CHANGE UP (ref 80–100)
MONOCYTES # BLD AUTO: 0.3 K/UL — SIGNIFICANT CHANGE UP (ref 0–0.9)
MONOCYTES NFR BLD AUTO: 5.7 % — SIGNIFICANT CHANGE UP (ref 2–14)
NEUTROPHILS # BLD AUTO: 3.1 K/UL — SIGNIFICANT CHANGE UP (ref 1.8–7.4)
NEUTROPHILS NFR BLD AUTO: 57.1 % — SIGNIFICANT CHANGE UP (ref 43–77)
PLATELET # BLD AUTO: 123 K/UL — LOW (ref 150–400)
RBC # BLD: 5.03 M/UL — SIGNIFICANT CHANGE UP (ref 4.2–5.8)
RBC # FLD: 12.9 % — SIGNIFICANT CHANGE UP (ref 10.3–14.5)
WBC # BLD: 5.4 K/UL — SIGNIFICANT CHANGE UP (ref 3.8–10.5)
WBC # FLD AUTO: 5.4 K/UL — SIGNIFICANT CHANGE UP (ref 3.8–10.5)

## 2021-11-15 PROCEDURE — 99213 OFFICE O/P EST LOW 20 MIN: CPT

## 2021-11-15 NOTE — HISTORY OF PRESENT ILLNESS
[de-identified] : 66 M here for evaluation of thrombocytopenia. Labs on 6/2021 showed low plt, increased liver enzyme.  Pt reports that his plts were low at least for the past 5 years ago. Never require treatment.\par 3/9/2021: plt 98, normal wbc, hgb,\par 5/6/2020; plt 130\par Pt is a current everday smoker, not ready to quit. Denies any hematologic disorder in the family. No easy bruising. Denies HA. CP, SOB, abd pain, constipation, diarrhea, melena, hematuria, dysuria. \par \par \par PCP Erwin Napier [phone] [de-identified] : 11/15/21: pt's plt improved to 123 today from 70  4 weeks ago. Pt feels well. Denies HA. CP, SOB, abd pain, constipation, diarrhea, melena, hematuria, dysuria.

## 2021-11-15 NOTE — ASSESSMENT
[FreeTextEntry1] : 66 M here for evaluation of thrombocytopenia. Labs on 6/2021 showed low plt, increased liver enzyme.  Pt reports that his plts were low at least for the past 5 years ago. Never require treatment.\par 3/9/2021: plt 98, normal wbc, hgb,\par 5/6/2020; plt 130\par \par # Thrombocytopenia\par -likely ITP given the chronicity and normal WBC, Hb.\par -neg hepatitis panel\par -PBS reviewed 10/2021: normal RBC with appropriate central pallor, no schistocytes.Granulocytes, lymphocytes appear normal morphologically, no increased in immature cells, decreased platelets, no clumping \par -underlying hematologic cause is unlikely \par -pt's plt 123 today. No need for medical treatment\par -can monitor CBC every 3-6 months\par \par # Smoking Cessation\par -pt not ready to quit\par -counseled pt on reducing smoking \par \par \par follow up at needed\par \par PCP Erwin Napier [phone]

## 2022-03-14 RX ORDER — FAMOTIDINE 40 MG/1
40 TABLET, FILM COATED ORAL
Qty: 30 | Refills: 0 | Status: ACTIVE | COMMUNITY
Start: 2021-03-10

## 2022-03-14 RX ORDER — CYCLOBENZAPRINE HYDROCHLORIDE 10 MG/1
10 TABLET, FILM COATED ORAL
Refills: 0 | Status: ACTIVE | COMMUNITY
Start: 2020-12-01

## 2022-03-15 ENCOUNTER — NON-APPOINTMENT (OUTPATIENT)
Age: 67
End: 2022-03-15

## 2022-03-15 ENCOUNTER — APPOINTMENT (OUTPATIENT)
Dept: CARDIOLOGY | Facility: CLINIC | Age: 67
End: 2022-03-15
Payer: MEDICARE

## 2022-03-15 VITALS
SYSTOLIC BLOOD PRESSURE: 116 MMHG | RESPIRATION RATE: 14 BRPM | HEIGHT: 72 IN | WEIGHT: 142 LBS | TEMPERATURE: 98.4 F | HEART RATE: 112 BPM | DIASTOLIC BLOOD PRESSURE: 74 MMHG | OXYGEN SATURATION: 98 % | BODY MASS INDEX: 19.23 KG/M2

## 2022-03-15 DIAGNOSIS — J44.9 CHRONIC OBSTRUCTIVE PULMONARY DISEASE, UNSPECIFIED: ICD-10-CM

## 2022-03-15 DIAGNOSIS — R07.9 CHEST PAIN, UNSPECIFIED: ICD-10-CM

## 2022-03-15 PROCEDURE — 99213 OFFICE O/P EST LOW 20 MIN: CPT

## 2022-03-15 PROCEDURE — 93000 ELECTROCARDIOGRAM COMPLETE: CPT

## 2022-03-15 NOTE — HISTORY OF PRESENT ILLNESS
[FreeTextEntry1] : 65 yo man, single, no children, doesn’t work, retired used to work with disabled patients. Since 2017 has been C/O chest pain, intermittent, not effort related, pressure like, mid sternal, sharp, may last hours, does not radiate, not associated with SOB, palpitations or dizziness. Never effort related. The last episode lasted 3 days and was not associated with SOB, palpitations or dizziness. Denies SOB, orthopnea or PND. Denies palpitations or ankle swelling.\par On 11/3/2020 he presented to [facility] with chest pain. Underwent tests, cardiac troponins and ECG were WNL and was D/[facility]to home. \par Since that visit he has been asymptomatic and at the present time he denies chest pain or SOB. \par Underwent nuclear stress test in 3/4/2021 that revealed normal perfusion and normal EF.\par Echocardiogram performed 1/14/2021 was also WNL. \par Has been under follow up with Hem/Onc for thrombocytopenia and elevated LFTs. Most likely ITP. Under follow up only. No treatment at the present time. \par Hyperlipidemia not treated\par COPD treated with meds\par Underwent Rt knee surgery in 2014\par Smokes 5 cigs/day, smoked a pack/day until 2017\par Doesnt drink alcohol\par Denies the use of illicit drugs\par No family history of heart disease\par Received the COVID 19 vaccine and boosters. \par

## 2022-03-15 NOTE — DISCUSSION/SUMMARY
[FreeTextEntry1] : Mr. JACOBO CRAMER is a 66 year male with atypical chest pain. Had an Nuclear stress test and echo in the past that were WNL.  \par Will continue same medications\par Discussed quitting smoking\par Routine follow up in 6 months\par

## 2022-03-15 NOTE — ASSESSMENT
[FreeTextEntry1] : ECG performed today at the office revealed a Sinus tachycardia 112 bpm, with normal AQRS, MN, QRS and QTc.\par \par

## 2022-05-06 ENCOUNTER — OUTPATIENT (OUTPATIENT)
Dept: OUTPATIENT SERVICES | Facility: HOSPITAL | Age: 67
LOS: 1 days | Discharge: HOME | End: 2022-05-06

## 2022-05-06 DIAGNOSIS — D69.6 THROMBOCYTOPENIA, UNSPECIFIED: ICD-10-CM

## 2022-05-09 ENCOUNTER — RESULT REVIEW (OUTPATIENT)
Age: 67
End: 2022-05-09

## 2022-05-09 ENCOUNTER — APPOINTMENT (OUTPATIENT)
Dept: HEMATOLOGY ONCOLOGY | Facility: CLINIC | Age: 67
End: 2022-05-09
Payer: MEDICARE

## 2022-05-09 VITALS
HEART RATE: 71 BPM | HEIGHT: 72 IN | BODY MASS INDEX: 19.37 KG/M2 | WEIGHT: 143 LBS | DIASTOLIC BLOOD PRESSURE: 86 MMHG | OXYGEN SATURATION: 97 % | SYSTOLIC BLOOD PRESSURE: 133 MMHG

## 2022-05-09 LAB
BASOPHILS # BLD AUTO: 0 K/UL — SIGNIFICANT CHANGE UP (ref 0–0.2)
BASOPHILS NFR BLD AUTO: 0.7 % — SIGNIFICANT CHANGE UP (ref 0–2)
EOSINOPHIL # BLD AUTO: 0.1 K/UL — SIGNIFICANT CHANGE UP (ref 0–0.5)
EOSINOPHIL NFR BLD AUTO: 1 % — SIGNIFICANT CHANGE UP (ref 0–6)
HCT VFR BLD CALC: 46.8 % — SIGNIFICANT CHANGE UP (ref 39–50)
HGB BLD-MCNC: 15.5 G/DL — SIGNIFICANT CHANGE UP (ref 13–17)
LYMPHOCYTES # BLD AUTO: 1.3 K/UL — SIGNIFICANT CHANGE UP (ref 1–3.3)
LYMPHOCYTES # BLD AUTO: 23 % — SIGNIFICANT CHANGE UP (ref 13–44)
MCHC RBC-ENTMCNC: 31.4 PG — SIGNIFICANT CHANGE UP (ref 27–34)
MCHC RBC-ENTMCNC: 33.2 G/DL — SIGNIFICANT CHANGE UP (ref 32–36)
MCV RBC AUTO: 94.7 FL — SIGNIFICANT CHANGE UP (ref 80–100)
MONOCYTES # BLD AUTO: 0.4 K/UL — SIGNIFICANT CHANGE UP (ref 0–0.9)
MONOCYTES NFR BLD AUTO: 7.3 % — SIGNIFICANT CHANGE UP (ref 2–14)
NEUTROPHILS # BLD AUTO: 3.7 K/UL — SIGNIFICANT CHANGE UP (ref 1.8–7.4)
NEUTROPHILS NFR BLD AUTO: 68 % — SIGNIFICANT CHANGE UP (ref 43–77)
PLATELET # BLD AUTO: 46 K/UL — LOW (ref 150–400)
RBC # BLD: 4.94 M/UL — SIGNIFICANT CHANGE UP (ref 4.2–5.8)
RBC # FLD: 12.4 % — SIGNIFICANT CHANGE UP (ref 10.3–14.5)
WBC # BLD: 5.5 K/UL — SIGNIFICANT CHANGE UP (ref 3.8–10.5)
WBC # FLD AUTO: 5.5 K/UL — SIGNIFICANT CHANGE UP (ref 3.8–10.5)

## 2022-05-09 PROCEDURE — 99203 OFFICE O/P NEW LOW 30 MIN: CPT

## 2022-05-09 PROCEDURE — 99213 OFFICE O/P EST LOW 20 MIN: CPT

## 2022-05-09 NOTE — ASSESSMENT
[FreeTextEntry1] : 66 M here for evaluation of thrombocytopenia. Labs on 6/2021 showed low plt, increased liver enzyme.  Pt reports that his plts were low at least for the past 5 years ago. Never require treatment.\par 3/9/2021: plt 98, normal wbc, hgb,\par 5/6/2020; plt 130\par \par # Thrombocytopenia\par -likely ITP given the chronicity (5 years)and normal WBC, Hb.\par -neg hepatitis panel\par -PBS reviewed 10/2021: normal RBC with appropriate central pallor, no schistocytes.Granulocytes, lymphocytes appear normal morphologically, no increased in immature cells, decreased platelets, no clumping \par -underlying hematologic cause is unlikely \par -check iron, B12, folate to r/o deficiencies.\par -pt's plt 46 today. No need for medical treatment plt > 30\par -will repeat in 4 weeks. If it falls below 30, will start a course of steroid\par \par # Smoking Cessation\par -pt not ready to quit\par -counseled pt on reducing smoking \par \par \par Follow up in 4 weeks\par \par PCP Erwin Napier [phone]

## 2022-05-09 NOTE — HISTORY OF PRESENT ILLNESS
[de-identified] : 66 M here for evaluation of thrombocytopenia. Labs on 6/2021 showed low plt, increased liver enzyme.  Pt reports that his plts were low at least for the past 5 years ago. Never require treatment.\par 3/9/2021: plt 98, normal wbc, hgb,\par 5/6/2020; plt 130\par Pt is a current everday smoker, not ready to quit. Denies any hematologic disorder in the family. No easy bruising. Denies HA. CP, SOB, abd pain, constipation, diarrhea, melena, hematuria, dysuria. \par \par \par PCP Erwin Napier [phone] [de-identified] : 11/15/21: pt's plt improved to 123 today from 70  4 weeks ago. Pt feels well. Denies HA. CP, SOB, abd pain, constipation, diarrhea, melena, hematuria, dysuria. \par \par 5/9/22: pt is here due to fall in plt to 66. He has had low plt for at least past 5 years. Likely ITP. No treatment required so far. No easy bruising. Denies HA. CP, SOB, abd pain, constipation, diarrhea, melena, hematuria, dysuria.

## 2022-05-10 LAB
ALBUMIN SERPL ELPH-MCNC: 4.7 G/DL
ALP BLD-CCNC: 86 U/L
ALT SERPL-CCNC: 23 U/L
ANION GAP SERPL CALC-SCNC: 9 MMOL/L
AST SERPL-CCNC: 25 U/L
BILIRUB SERPL-MCNC: 0.4 MG/DL
BUN SERPL-MCNC: 15 MG/DL
CALCIUM SERPL-MCNC: 9.8 MG/DL
CHLORIDE SERPL-SCNC: 105 MMOL/L
CO2 SERPL-SCNC: 27 MMOL/L
CREAT SERPL-MCNC: 1.03 MG/DL
EGFR: 81 ML/MIN/1.73M2
FERRITIN SERPL-MCNC: 33 NG/ML
FOLATE SERPL-MCNC: >20 NG/ML
GLUCOSE SERPL-MCNC: 120 MG/DL
IRON SATN MFR SERPL: 25 %
IRON SERPL-MCNC: 90 UG/DL
POTASSIUM SERPL-SCNC: 5.1 MMOL/L
PROT SERPL-MCNC: 7.1 G/DL
RBC # BLD: 4.9 M/UL
RETICS # AUTO: 1.2 %
RETICS AGGREG/RBC NFR: 60.3 K/UL
SODIUM SERPL-SCNC: 142 MMOL/L
TIBC SERPL-MCNC: 358 UG/DL
UIBC SERPL-MCNC: 268 UG/DL
VIT B12 SERPL-MCNC: 793 PG/ML

## 2022-06-08 ENCOUNTER — OUTPATIENT (OUTPATIENT)
Dept: OUTPATIENT SERVICES | Facility: HOSPITAL | Age: 67
LOS: 1 days | Discharge: HOME | End: 2022-06-08

## 2022-06-08 DIAGNOSIS — D64.9 ANEMIA, UNSPECIFIED: ICD-10-CM

## 2022-06-09 ENCOUNTER — RESULT REVIEW (OUTPATIENT)
Age: 67
End: 2022-06-09

## 2022-06-09 ENCOUNTER — APPOINTMENT (OUTPATIENT)
Dept: HEMATOLOGY ONCOLOGY | Facility: CLINIC | Age: 67
End: 2022-06-09
Payer: MEDICARE

## 2022-06-09 VITALS
SYSTOLIC BLOOD PRESSURE: 126 MMHG | OXYGEN SATURATION: 96 % | BODY MASS INDEX: 18.96 KG/M2 | HEIGHT: 72 IN | HEART RATE: 92 BPM | WEIGHT: 140 LBS | DIASTOLIC BLOOD PRESSURE: 85 MMHG

## 2022-06-09 LAB
BASOPHILS # BLD AUTO: 0 K/UL — SIGNIFICANT CHANGE UP (ref 0–0.2)
BASOPHILS NFR BLD AUTO: 0.6 % — SIGNIFICANT CHANGE UP (ref 0–2)
EOSINOPHIL # BLD AUTO: 0 K/UL — SIGNIFICANT CHANGE UP (ref 0–0.5)
EOSINOPHIL NFR BLD AUTO: 0.7 % — SIGNIFICANT CHANGE UP (ref 0–6)
HCT VFR BLD CALC: 48.3 % — SIGNIFICANT CHANGE UP (ref 39–50)
HGB BLD-MCNC: 16 G/DL — SIGNIFICANT CHANGE UP (ref 13–17)
LYMPHOCYTES # BLD AUTO: 1.6 K/UL — SIGNIFICANT CHANGE UP (ref 1–3.3)
LYMPHOCYTES # BLD AUTO: 25.1 % — SIGNIFICANT CHANGE UP (ref 13–44)
MCHC RBC-ENTMCNC: 31.4 PG — SIGNIFICANT CHANGE UP (ref 27–34)
MCHC RBC-ENTMCNC: 33.2 G/DL — SIGNIFICANT CHANGE UP (ref 32–36)
MCV RBC AUTO: 94.5 FL — SIGNIFICANT CHANGE UP (ref 80–100)
MONOCYTES # BLD AUTO: 0.4 K/UL — SIGNIFICANT CHANGE UP (ref 0–0.9)
MONOCYTES NFR BLD AUTO: 6.9 % — SIGNIFICANT CHANGE UP (ref 2–14)
NEUTROPHILS # BLD AUTO: 4.3 K/UL — SIGNIFICANT CHANGE UP (ref 1.8–7.4)
NEUTROPHILS NFR BLD AUTO: 66.8 % — SIGNIFICANT CHANGE UP (ref 43–77)
PLATELET # BLD AUTO: 74 K/UL — LOW (ref 150–400)
RBC # BLD: 5.11 M/UL — SIGNIFICANT CHANGE UP (ref 4.2–5.8)
RBC # FLD: 12.9 % — SIGNIFICANT CHANGE UP (ref 10.3–14.5)
WBC # BLD: 6.4 K/UL — SIGNIFICANT CHANGE UP (ref 3.8–10.5)
WBC # FLD AUTO: 6.4 K/UL — SIGNIFICANT CHANGE UP (ref 3.8–10.5)

## 2022-06-09 PROCEDURE — 99213 OFFICE O/P EST LOW 20 MIN: CPT

## 2022-06-09 NOTE — ASSESSMENT
[FreeTextEntry1] : 66 M here for evaluation of thrombocytopenia. Labs on 6/2021 showed low plt, increased liver enzyme.  Pt reports that his plts were low at least for the past 5 years ago. Never require treatment.\par 3/9/2021: plt 98, normal wbc, hgb,\par 5/6/2020; plt 130\par \par # Thrombocytopenia\par -likely ITP given the chronicity (5 years)and normal WBC, Hb.\par -neg hepatitis panel\par -PBS reviewed 10/2021: normal RBC with appropriate central pallor, no schistocytes.Granulocytes, lymphocytes appear normal morphologically, no increased in immature cells, decreased platelets, no clumping \par -underlying hematologic cause is unlikely \par -normal iron, B12, folate\par -pt's plt 74 today. No need for medical treatment plt > 30\par -will repeat in 3 months\par \par # Smoking Cessation\par -pt not ready to quit\par -counseled pt on reducing smoking \par \par \par Follow up in 3 months\par \par PCP Erwin Napier [phone]

## 2022-06-09 NOTE — HISTORY OF PRESENT ILLNESS
[de-identified] : 66 M here for evaluation of thrombocytopenia. Labs on 6/2021 showed low plt, increased liver enzyme.  Pt reports that his plts were low at least for the past 5 years ago. Never require treatment.\par 3/9/2021: plt 98, normal wbc, hgb,\par 5/6/2020; plt 130\par Pt is a current everyday smoker, not ready to quit. Denies any hematologic disorder in the family. No easy bruising. Denies HA. CP, SOB, abd pain, constipation, diarrhea, melena, hematuria, dysuria. \par \par \par PCP Erwin Napier [phone] [de-identified] : 11/15/21: pt's plt improved to 123 today from 70  4 weeks ago. Pt feels well. Denies HA. CP, SOB, abd pain, constipation, diarrhea, melena, hematuria, dysuria. \par \par 5/9/22: pt is here due to fall in plt to 66. He has had low plt for at least past 5 years. Likely ITP. No treatment required so far. No easy bruising. Denies HA. CP, SOB, abd pain, constipation, diarrhea, melena, hematuria, dysuria. \par \par 6/9/22: Felice is here for fu for thrombocytopenia. Plt improves to 74 from 46.  No easy bruising. Denies HA. CP, SOB, abd pain, constipation, diarrhea, melena, hematuria, dysuria.

## 2022-09-09 ENCOUNTER — OUTPATIENT (OUTPATIENT)
Dept: OUTPATIENT SERVICES | Facility: HOSPITAL | Age: 67
LOS: 1 days | Discharge: HOME | End: 2022-09-09

## 2022-09-09 DIAGNOSIS — D69.6 THROMBOCYTOPENIA, UNSPECIFIED: ICD-10-CM

## 2022-09-14 ENCOUNTER — RESULT REVIEW (OUTPATIENT)
Age: 67
End: 2022-09-14

## 2022-09-14 ENCOUNTER — APPOINTMENT (OUTPATIENT)
Dept: HEMATOLOGY ONCOLOGY | Facility: CLINIC | Age: 67
End: 2022-09-14

## 2022-09-14 VITALS
DIASTOLIC BLOOD PRESSURE: 88 MMHG | OXYGEN SATURATION: 92 % | HEART RATE: 99 BPM | SYSTOLIC BLOOD PRESSURE: 131 MMHG | BODY MASS INDEX: 18.43 KG/M2 | HEIGHT: 72 IN | WEIGHT: 136.05 LBS

## 2022-09-14 LAB
ANISOCYTOSIS BLD QL: SLIGHT — SIGNIFICANT CHANGE UP
BASOPHILS # BLD AUTO: 0 K/UL — SIGNIFICANT CHANGE UP (ref 0–0.2)
BASOPHILS NFR BLD AUTO: 0.7 % — SIGNIFICANT CHANGE UP (ref 0–2)
EOSINOPHIL # BLD AUTO: 0 K/UL — SIGNIFICANT CHANGE UP (ref 0–0.5)
EOSINOPHIL NFR BLD AUTO: 0.5 % — SIGNIFICANT CHANGE UP (ref 0–6)
HCT VFR BLD CALC: 44.9 % — SIGNIFICANT CHANGE UP (ref 39–50)
HGB BLD-MCNC: 14.9 G/DL — SIGNIFICANT CHANGE UP (ref 13–17)
HYPOCHROMIA BLD QL: SLIGHT — SIGNIFICANT CHANGE UP
LG PLATELETS BLD QL AUTO: SLIGHT — SIGNIFICANT CHANGE UP
LYMPHOCYTES # BLD AUTO: 1.1 K/UL — SIGNIFICANT CHANGE UP (ref 1–3.3)
LYMPHOCYTES # BLD AUTO: 15 % — SIGNIFICANT CHANGE UP (ref 13–44)
MCHC RBC-ENTMCNC: 31 PG — SIGNIFICANT CHANGE UP (ref 27–34)
MCHC RBC-ENTMCNC: 33.2 G/DL — SIGNIFICANT CHANGE UP (ref 32–36)
MCV RBC AUTO: 93.2 FL — SIGNIFICANT CHANGE UP (ref 80–100)
MONOCYTES # BLD AUTO: 0.5 K/UL — SIGNIFICANT CHANGE UP (ref 0–0.9)
MONOCYTES NFR BLD AUTO: 4 % — SIGNIFICANT CHANGE UP (ref 2–14)
NEUTROPHILS # BLD AUTO: 4.8 K/UL — SIGNIFICANT CHANGE UP (ref 1.8–7.4)
NEUTROPHILS NFR BLD AUTO: 80 % — HIGH (ref 43–77)
OVALOCYTES BLD QL SMEAR: SLIGHT — SIGNIFICANT CHANGE UP
PLAT MORPH BLD: NORMAL — SIGNIFICANT CHANGE UP
PLATELET # BLD AUTO: 62 K/UL — LOW (ref 150–400)
POIKILOCYTOSIS BLD QL AUTO: SLIGHT — SIGNIFICANT CHANGE UP
RBC # BLD: 4.82 M/UL — SIGNIFICANT CHANGE UP (ref 4.2–5.8)
RBC # FLD: 13.3 % — SIGNIFICANT CHANGE UP (ref 10.3–14.5)
RBC BLD AUTO: SIGNIFICANT CHANGE UP
VARIANT LYMPHS # BLD: 1 % — SIGNIFICANT CHANGE UP (ref 0–6)
WBC # BLD: 6.4 K/UL — SIGNIFICANT CHANGE UP (ref 3.8–10.5)
WBC # FLD AUTO: 6.4 K/UL — SIGNIFICANT CHANGE UP (ref 3.8–10.5)

## 2022-09-14 PROCEDURE — 99214 OFFICE O/P EST MOD 30 MIN: CPT

## 2022-09-14 NOTE — ASSESSMENT
[FreeTextEntry1] : 66 M here for evaluation of thrombocytopenia. Labs on 6/2021 showed low plt, increased liver enzyme.  Pt reports that his plts were low at least for the past 5 years ago. Never require treatment.\par 3/9/2021: plt 98, normal wbc, hgb,\par 5/6/2020; plt 130\par \par # Thrombocytopenia\par -likely ITP given the chronicity (5 years)and normal WBC, Hb.\par -neg hepatitis panel\par -PBS reviewed 10/2021: normal RBC with appropriate central pallor, no schistocytes.Granulocytes, lymphocytes appear normal morphologically, no increased in immature cells, decreased platelets, no clumping \par -underlying hematologic cause is unlikely \par -normal iron, B12, folate\par -pt's plt 62 today. No need for medical treatment plt > 30\par -will repeat in 3 months\par \par # Smoking Cessation\par -pt not ready to quit\par -again counseled pt on reducing smoking \par \par # Ankle pain\par -offered PM&R evaluation but patient not interested \par \par #Dizziness x 2 episodes \par - patient will d/w PCP\par - denies near syncope \par \par \par \par PCP Erwin Napier [phone]

## 2022-09-14 NOTE — HISTORY OF PRESENT ILLNESS
[de-identified] : 66 M here for evaluation of thrombocytopenia. Labs on 6/2021 showed low plt, increased liver enzyme.  Pt reports that his plts were low at least for the past 5 years ago. Never require treatment.\par 3/9/2021: plt 98, normal wbc, hgb,\par 5/6/2020; plt 130\par Pt is a current everyday smoker, not ready to quit. Denies any hematologic disorder in the family. No easy bruising. Denies HA. CP, SOB, abd pain, constipation, diarrhea, melena, hematuria, dysuria. \par \par \par PCP Erwin Napier [phone] [de-identified] : 11/15/21: pt's plt improved to 123 today from 70  4 weeks ago. Pt feels well. Denies HA. CP, SOB, abd pain, constipation, diarrhea, melena, hematuria, dysuria. \par \par 5/9/22: pt is here due to fall in plt to 66. He has had low plt for at least past 5 years. Likely ITP. No treatment required so far. No easy bruising. Denies HA. CP, SOB, abd pain, constipation, diarrhea, melena, hematuria, dysuria. \par \par 6/9/22: Felice is here for fu for thrombocytopenia. Plt improves to 74 from 46.  No easy bruising. Denies HA. CP, SOB, abd pain, constipation, diarrhea, melena, hematuria, dysuria. \par \par 9/14/22:  Patient presents for follow up regarding thrombocytopenia \par + Intermittent dizziness. + R ankle pain.  Denies any easy bruising. No petechiae.

## 2022-09-21 ENCOUNTER — APPOINTMENT (OUTPATIENT)
Dept: CARDIOLOGY | Facility: CLINIC | Age: 67
End: 2022-09-21

## 2022-09-21 ENCOUNTER — NON-APPOINTMENT (OUTPATIENT)
Age: 67
End: 2022-09-21

## 2022-09-21 VITALS
HEIGHT: 72 IN | HEART RATE: 83 BPM | DIASTOLIC BLOOD PRESSURE: 77 MMHG | WEIGHT: 133 LBS | OXYGEN SATURATION: 97 % | SYSTOLIC BLOOD PRESSURE: 117 MMHG | BODY MASS INDEX: 18.01 KG/M2 | TEMPERATURE: 98.1 F

## 2022-09-21 PROCEDURE — 93000 ELECTROCARDIOGRAM COMPLETE: CPT

## 2022-09-21 PROCEDURE — 99213 OFFICE O/P EST LOW 20 MIN: CPT

## 2022-09-21 RX ORDER — MELOXICAM 7.5 MG/1
7.5 TABLET ORAL
Refills: 0 | Status: DISCONTINUED | COMMUNITY
Start: 2020-12-01 | End: 2022-09-21

## 2022-09-21 NOTE — PHYSICAL EXAM

## 2022-09-28 ENCOUNTER — NON-APPOINTMENT (OUTPATIENT)
Age: 67
End: 2022-09-28

## 2022-09-29 NOTE — DISCUSSION/SUMMARY
[FreeTextEntry1] : Mr. JACOBO RANKIN is a 67 year male with atypical chest pain. Had an nuclear stress test and echo in the past that were WNL.  \par Has thrombocytopenia (62K) with elevated LFTs possible related to ITP. Under FU with Hem/Onc\par Has possible RLE claudication and was told in the past that he may have PAD? Will request another KEKE.\par Mr. Rankin will undergo a colonoscopy. This is a low risk patient undergoing a low risk procedure (cardiac risk <1%). Therefore there is no need for further testing prior to the procedure.\par Discussed quitting smoking\par Routine follow up in 6 months\par

## 2022-09-29 NOTE — ASSESSMENT
[FreeTextEntry1] : ECG performed today at the office revealed a Sinus tachycardia 68 bpm, with normal AQRS, CO, QRS and QTc.\par \par

## 2022-09-29 NOTE — HISTORY OF PRESENT ILLNESS
[FreeTextEntry1] : 66 yo man, single, no children, doesn’t work, retired used to work with disabled patients. Since 2017 has been C/O chest pain, intermittent, not effort related, pressure like, mid sternal, sharp, may last hours, does not radiate, not associated with SOB, palpitations or dizziness. Never effort related. Denies SOB, orthopnea or PND. Denies palpitations or ankle swelling.\par On 11/3/2020 he presented to [facility] with chest pain. Underwent tests, cardiac troponins and ECG were WNL and was D/Mario Alberto home. \par Since that visit he has been asymptomatic and at the present time he denies chest pain or SOB. \par Underwent nuclear stress test in 3/4/2021 that revealed normal perfusion and normal EF.\par Echocardiogram performed 1/14/2021 was also WNL. \par Has been under follow up with Hem/Onc for thrombocytopenia (62K) and elevated LFTs. Most likely ITP. Under follow up only. No treatment at the present time. \par Hyperlipidemia not treated\par COPD treated with meds\par Underwent Rt knee surgery in 2014\par Possible PAD, RLE. Possible ARB in the past?\par Smokes 5 cigs/day, smoked a pack/day until 2017\par Doesnt drink alcohol\par Denies the use of illicit drugs\par No family history of heart disease\par Received the COVID 19 vaccine and boosters. \par

## 2022-10-05 ENCOUNTER — RESULT REVIEW (OUTPATIENT)
Age: 67
End: 2022-10-05

## 2022-10-05 ENCOUNTER — APPOINTMENT (OUTPATIENT)
Dept: HEMATOLOGY ONCOLOGY | Facility: CLINIC | Age: 67
End: 2022-10-05

## 2022-10-05 LAB
BASOPHILS # BLD AUTO: 0 K/UL — SIGNIFICANT CHANGE UP (ref 0–0.2)
BASOPHILS NFR BLD AUTO: 0.8 % — SIGNIFICANT CHANGE UP (ref 0–2)
EOSINOPHIL # BLD AUTO: 0 K/UL — SIGNIFICANT CHANGE UP (ref 0–0.5)
EOSINOPHIL NFR BLD AUTO: 0.8 % — SIGNIFICANT CHANGE UP (ref 0–6)
HCT VFR BLD CALC: 47.2 % — SIGNIFICANT CHANGE UP (ref 39–50)
HGB BLD-MCNC: 15.7 G/DL — SIGNIFICANT CHANGE UP (ref 13–17)
LYMPHOCYTES # BLD AUTO: 1.4 K/UL — SIGNIFICANT CHANGE UP (ref 1–3.3)
LYMPHOCYTES # BLD AUTO: 24.3 % — SIGNIFICANT CHANGE UP (ref 13–44)
MCHC RBC-ENTMCNC: 32.3 PG — SIGNIFICANT CHANGE UP (ref 27–34)
MCHC RBC-ENTMCNC: 33.2 G/DL — SIGNIFICANT CHANGE UP (ref 32–36)
MCV RBC AUTO: 97.1 FL — SIGNIFICANT CHANGE UP (ref 80–100)
MONOCYTES # BLD AUTO: 0.4 K/UL — SIGNIFICANT CHANGE UP (ref 0–0.9)
MONOCYTES NFR BLD AUTO: 7 % — SIGNIFICANT CHANGE UP (ref 2–14)
NEUTROPHILS # BLD AUTO: 3.8 K/UL — SIGNIFICANT CHANGE UP (ref 1.8–7.4)
NEUTROPHILS NFR BLD AUTO: 67.1 % — SIGNIFICANT CHANGE UP (ref 43–77)
PLATELET # BLD AUTO: 54 K/UL — LOW (ref 150–400)
RBC # BLD: 4.86 M/UL — SIGNIFICANT CHANGE UP (ref 4.2–5.8)
RBC # FLD: 12.8 % — SIGNIFICANT CHANGE UP (ref 10.3–14.5)
WBC # BLD: 5.7 K/UL — SIGNIFICANT CHANGE UP (ref 3.8–10.5)
WBC # FLD AUTO: 5.7 K/UL — SIGNIFICANT CHANGE UP (ref 3.8–10.5)

## 2022-10-07 ENCOUNTER — APPOINTMENT (OUTPATIENT)
Dept: CARDIOLOGY | Facility: CLINIC | Age: 67
End: 2022-10-07

## 2022-10-07 PROCEDURE — 93923 UPR/LXTR ART STDY 3+ LVLS: CPT

## 2022-10-12 ENCOUNTER — NON-APPOINTMENT (OUTPATIENT)
Age: 67
End: 2022-10-12

## 2022-10-12 DIAGNOSIS — R68.89 OTHER GENERAL SYMPTOMS AND SIGNS: ICD-10-CM

## 2022-11-03 ENCOUNTER — APPOINTMENT (OUTPATIENT)
Dept: CARDIOLOGY | Facility: CLINIC | Age: 67
End: 2022-11-03

## 2022-11-03 PROCEDURE — 93925 LOWER EXTREMITY STUDY: CPT

## 2022-11-15 ENCOUNTER — APPOINTMENT (OUTPATIENT)
Dept: CARDIOLOGY | Facility: CLINIC | Age: 67
End: 2022-11-15

## 2022-11-23 ENCOUNTER — APPOINTMENT (OUTPATIENT)
Dept: CARDIOLOGY | Facility: CLINIC | Age: 67
End: 2022-11-23
Payer: MEDICARE

## 2022-11-23 VITALS
SYSTOLIC BLOOD PRESSURE: 113 MMHG | TEMPERATURE: 98.4 F | HEIGHT: 72 IN | WEIGHT: 139 LBS | HEART RATE: 88 BPM | DIASTOLIC BLOOD PRESSURE: 75 MMHG | BODY MASS INDEX: 18.83 KG/M2 | OXYGEN SATURATION: 95 %

## 2022-11-23 PROCEDURE — 99215 OFFICE O/P EST HI 40 MIN: CPT

## 2022-11-23 NOTE — HISTORY OF PRESENT ILLNESS
[FreeTextEntry1] : 66 y/o male with PMHx of atypical chest pain but has negative stress test and normal EF on echo \par Referred to me because of right LE claudication \par For the last 2-3 months , he has been experiencing Right Foot pain when he walks < 1/2 block \par pain will go away when he stops but sometimes he walks through the pain and it gets better \par He sometimes c/o of the same pain at rest \par no ulcers, no non healing wounds , no numbness/tingling \par KEEK showed moderate ischemia in RLE \par U/S showed  of the right distal SFA.

## 2022-11-23 NOTE — ASSESSMENT
[FreeTextEntry1] : 66 y/o male with RLE claudication , Mark IIB , lifestyle limiting \par U/S showed  of right SFA and moderate to severe disease of the left SFA \par KEKE showed moderate ischemia on the right \par will continue ASA 81 mg \par obtain lipid profile from PCP and will need to be on statins after know the baseline lipid profile \par indicated for peripheral angiogram and angioplasty/stenting of the right femoropopliteal artery . \par

## 2022-11-23 NOTE — PHYSICAL EXAM
[Normal Appearance] : normal appearance [No Deformities] : no deformities [Abdomen Soft] : soft [Abdomen Tenderness] : non-tender [Abnormal Walk] : normal gait [Nail Clubbing] : no clubbing of the fingernails [No Venous Stasis] : no venous stasis [No Skin Ulcers] : no skin ulcer [Oriented To Time, Place, And Person] : oriented to person, place, and time [Affect] : the affect was normal [Mood] : the mood was normal [FreeTextEntry1] : right DP and PT not palpable, Left DP and PT + 1

## 2022-11-23 NOTE — REVIEW OF SYSTEMS
[Leg Claudication] : intermittent leg claudication [Negative] : Heme/Lymph [FreeTextEntry9] : claudication

## 2022-12-09 ENCOUNTER — OUTPATIENT (OUTPATIENT)
Dept: OUTPATIENT SERVICES | Facility: HOSPITAL | Age: 67
LOS: 1 days | Discharge: HOME | End: 2022-12-09

## 2022-12-09 DIAGNOSIS — D69.6 THROMBOCYTOPENIA, UNSPECIFIED: ICD-10-CM

## 2022-12-15 ENCOUNTER — APPOINTMENT (OUTPATIENT)
Dept: HEMATOLOGY ONCOLOGY | Facility: CLINIC | Age: 67
End: 2022-12-15

## 2022-12-20 ENCOUNTER — RESULT REVIEW (OUTPATIENT)
Age: 67
End: 2022-12-20

## 2022-12-20 ENCOUNTER — APPOINTMENT (OUTPATIENT)
Dept: HEMATOLOGY ONCOLOGY | Facility: CLINIC | Age: 67
End: 2022-12-20

## 2022-12-20 VITALS
OXYGEN SATURATION: 93 % | DIASTOLIC BLOOD PRESSURE: 83 MMHG | TEMPERATURE: 98 F | HEART RATE: 100 BPM | WEIGHT: 135 LBS | BODY MASS INDEX: 18.28 KG/M2 | HEIGHT: 72 IN | SYSTOLIC BLOOD PRESSURE: 113 MMHG

## 2022-12-20 LAB
BASOPHILS # BLD AUTO: 0 K/UL — SIGNIFICANT CHANGE UP (ref 0–0.2)
BASOPHILS NFR BLD AUTO: 0.7 % — SIGNIFICANT CHANGE UP (ref 0–2)
EOSINOPHIL # BLD AUTO: 0 K/UL — SIGNIFICANT CHANGE UP (ref 0–0.5)
EOSINOPHIL NFR BLD AUTO: 0.8 % — SIGNIFICANT CHANGE UP (ref 0–6)
HCT VFR BLD CALC: 47 % — SIGNIFICANT CHANGE UP (ref 39–50)
HGB BLD-MCNC: 15.9 G/DL — SIGNIFICANT CHANGE UP (ref 13–17)
LYMPHOCYTES # BLD AUTO: 2.1 K/UL — SIGNIFICANT CHANGE UP (ref 1–3.3)
LYMPHOCYTES # BLD AUTO: 32.6 % — SIGNIFICANT CHANGE UP (ref 13–44)
MCHC RBC-ENTMCNC: 31.9 PG — SIGNIFICANT CHANGE UP (ref 27–34)
MCHC RBC-ENTMCNC: 33.9 G/DL — SIGNIFICANT CHANGE UP (ref 32–36)
MCV RBC AUTO: 94.2 FL — SIGNIFICANT CHANGE UP (ref 80–100)
MONOCYTES # BLD AUTO: 0.5 K/UL — SIGNIFICANT CHANGE UP (ref 0–0.9)
MONOCYTES NFR BLD AUTO: 7.7 % — SIGNIFICANT CHANGE UP (ref 2–14)
NEUTROPHILS # BLD AUTO: 3.7 K/UL — SIGNIFICANT CHANGE UP (ref 1.8–7.4)
NEUTROPHILS NFR BLD AUTO: 58.2 % — SIGNIFICANT CHANGE UP (ref 43–77)
PLATELET # BLD AUTO: 41 K/UL — LOW (ref 150–400)
RBC # BLD: 4.99 M/UL — SIGNIFICANT CHANGE UP (ref 4.2–5.8)
RBC # FLD: 12.1 % — SIGNIFICANT CHANGE UP (ref 10.3–14.5)
WBC # BLD: 6.3 K/UL — SIGNIFICANT CHANGE UP (ref 3.8–10.5)
WBC # FLD AUTO: 6.3 K/UL — SIGNIFICANT CHANGE UP (ref 3.8–10.5)

## 2022-12-20 PROCEDURE — 99214 OFFICE O/P EST MOD 30 MIN: CPT

## 2022-12-20 NOTE — ASSESSMENT
[FreeTextEntry1] : 66 M here for evaluation of thrombocytopenia. Labs on 6/2021 showed low plt, increased liver enzyme.  Pt reports that his plts were low at least for the past 5 years ago. Never require treatment.\par 3/9/2021: plt 98, normal wbc, hgb,\par 5/6/2020; plt 130\par \par # Thrombocytopenia\par -likely ITP given the chronicity (5 years) and normal WBC, Hgb.\par -neg hepatitis panel\par -PBS reviewed 10/2021: normal RBC with appropriate central pallor, no schistocytes.Granulocytes, lymphocytes appear normal morphologically, no increased in immature cells, decreased platelets, no clumping \par -underlying hematologic cause is unlikely \par -normal iron, B12, folate\par -pt's PLT 41 today. No need for medical treatment with PLTs > 30 except that patient has a LE angiogram/angioplasty scheduled for 12/29/22 and PLTs should be >50\par -will recheck PLTs on 12/23, if still below 50 will start prednisone at 1mg/kg and taper off over the next ~3 weeks\par -will recheck again on 12/27 to assess response to steroids\par -plan d/w Dr. Gomez (Dr. Murphy on PTO)\par -Dr. Shanks follow up in 3 months\par \par # Smoking Cessation\par -pt not ready to quit\par -again counseled pt on reducing smoking \par -advised that his PAD was caused by smoking\par \par PCP Erwin Napier [phone]

## 2022-12-20 NOTE — HISTORY OF PRESENT ILLNESS
[de-identified] : 11/15/21: pt's plt improved to 123 today from 70  4 weeks ago. Pt feels well. Denies HA. CP, SOB, abd pain, constipation, diarrhea, melena, hematuria, dysuria. \par \par 5/9/22: pt is here due to fall in plt to 66. He has had low plt for at least past 5 years. Likely ITP. No treatment required so far. No easy bruising. Denies HA. CP, SOB, abd pain, constipation, diarrhea, melena, hematuria, dysuria. \par \par 6/9/22: Felice is here for fu for thrombocytopenia. Plt improves to 74 from 46.  No easy bruising. Denies HA. CP, SOB, abd pain, constipation, diarrhea, melena, hematuria, dysuria. \par \par 9/14/22:  Patient presents for follow up regarding thrombocytopenia \par + Intermittent dizziness. + R ankle pain.  Denies any easy bruising. No petechiae. \par \par 12/20/22:  Patient presents for follow up regarding thrombocytopenia. PLTs 41 today\par + Intermittent R LE pain.  Denies any easy bruising. No petechiae. No recent fevers, chills.  [de-identified] : 66 M here for evaluation of thrombocytopenia. Labs on 6/2021 showed low plt, increased liver enzyme.  Pt reports that his plts were low at least for the past 5 years ago. Never require treatment.\par 3/9/2021: plt 98, normal wbc, hgb,\par 5/6/2020; plt 130\par Pt is a current everyday smoker, not ready to quit. Denies any hematologic disorder in the family. No easy bruising. Denies HA. CP, SOB, abd pain, constipation, diarrhea, melena, hematuria, dysuria. \par \par \par PCP Erwin Napier [phone]

## 2022-12-23 ENCOUNTER — APPOINTMENT (OUTPATIENT)
Dept: HEMATOLOGY ONCOLOGY | Facility: CLINIC | Age: 67
End: 2022-12-23

## 2022-12-23 ENCOUNTER — RESULT REVIEW (OUTPATIENT)
Age: 67
End: 2022-12-23

## 2022-12-23 LAB
BASOPHILS # BLD AUTO: 0.1 K/UL — SIGNIFICANT CHANGE UP (ref 0–0.2)
BASOPHILS NFR BLD AUTO: 0.9 % — SIGNIFICANT CHANGE UP (ref 0–2)
EOSINOPHIL # BLD AUTO: 0 K/UL — SIGNIFICANT CHANGE UP (ref 0–0.5)
EOSINOPHIL NFR BLD AUTO: 0.4 % — SIGNIFICANT CHANGE UP (ref 0–6)
HCT VFR BLD CALC: 48.7 % — SIGNIFICANT CHANGE UP (ref 39–50)
HGB BLD-MCNC: 16.3 G/DL — SIGNIFICANT CHANGE UP (ref 13–17)
LYMPHOCYTES # BLD AUTO: 2.3 K/UL — SIGNIFICANT CHANGE UP (ref 1–3.3)
LYMPHOCYTES # BLD AUTO: 32.5 % — SIGNIFICANT CHANGE UP (ref 13–44)
MCHC RBC-ENTMCNC: 31.3 PG — SIGNIFICANT CHANGE UP (ref 27–34)
MCHC RBC-ENTMCNC: 33.5 G/DL — SIGNIFICANT CHANGE UP (ref 32–36)
MCV RBC AUTO: 93.5 FL — SIGNIFICANT CHANGE UP (ref 80–100)
MONOCYTES # BLD AUTO: 0.5 K/UL — SIGNIFICANT CHANGE UP (ref 0–0.9)
MONOCYTES NFR BLD AUTO: 6.9 % — SIGNIFICANT CHANGE UP (ref 2–14)
NEUTROPHILS # BLD AUTO: 4.1 K/UL — SIGNIFICANT CHANGE UP (ref 1.8–7.4)
NEUTROPHILS NFR BLD AUTO: 59.4 % — SIGNIFICANT CHANGE UP (ref 43–77)
PLATELET # BLD AUTO: 45 K/UL — LOW (ref 150–400)
RBC # BLD: 5.21 M/UL — SIGNIFICANT CHANGE UP (ref 4.2–5.8)
RBC # FLD: 12 % — SIGNIFICANT CHANGE UP (ref 10.3–14.5)
WBC # BLD: 7 K/UL — SIGNIFICANT CHANGE UP (ref 3.8–10.5)
WBC # FLD AUTO: 7 K/UL — SIGNIFICANT CHANGE UP (ref 3.8–10.5)

## 2022-12-27 ENCOUNTER — RESULT REVIEW (OUTPATIENT)
Age: 67
End: 2022-12-27

## 2022-12-27 ENCOUNTER — APPOINTMENT (OUTPATIENT)
Dept: HEMATOLOGY ONCOLOGY | Facility: CLINIC | Age: 67
End: 2022-12-27

## 2022-12-27 LAB
BASOPHILS # BLD AUTO: 0.1 K/UL — SIGNIFICANT CHANGE UP (ref 0–0.2)
BASOPHILS NFR BLD AUTO: 0.7 % — SIGNIFICANT CHANGE UP (ref 0–2)
EOSINOPHIL # BLD AUTO: 0.1 K/UL — SIGNIFICANT CHANGE UP (ref 0–0.5)
EOSINOPHIL NFR BLD AUTO: 0.6 % — SIGNIFICANT CHANGE UP (ref 0–6)
HCT VFR BLD CALC: 47.2 % — SIGNIFICANT CHANGE UP (ref 39–50)
HGB BLD-MCNC: 15.9 G/DL — SIGNIFICANT CHANGE UP (ref 13–17)
LYMPHOCYTES # BLD AUTO: 3.7 K/UL — HIGH (ref 1–3.3)
LYMPHOCYTES # BLD AUTO: 37.6 % — SIGNIFICANT CHANGE UP (ref 13–44)
MCHC RBC-ENTMCNC: 31.6 PG — SIGNIFICANT CHANGE UP (ref 27–34)
MCHC RBC-ENTMCNC: 33.8 G/DL — SIGNIFICANT CHANGE UP (ref 32–36)
MCV RBC AUTO: 93.7 FL — SIGNIFICANT CHANGE UP (ref 80–100)
MONOCYTES # BLD AUTO: 0.9 K/UL — SIGNIFICANT CHANGE UP (ref 0–0.9)
MONOCYTES NFR BLD AUTO: 9 % — SIGNIFICANT CHANGE UP (ref 2–14)
NEUTROPHILS # BLD AUTO: 5.1 K/UL — SIGNIFICANT CHANGE UP (ref 1.8–7.4)
NEUTROPHILS NFR BLD AUTO: 52.1 % — SIGNIFICANT CHANGE UP (ref 43–77)
PLATELET # BLD AUTO: 48 K/UL — LOW (ref 150–400)
RBC # BLD: 5.04 M/UL — SIGNIFICANT CHANGE UP (ref 4.2–5.8)
RBC # FLD: 12.1 % — SIGNIFICANT CHANGE UP (ref 10.3–14.5)
WBC # BLD: 9.7 K/UL — SIGNIFICANT CHANGE UP (ref 3.8–10.5)
WBC # FLD AUTO: 9.7 K/UL — SIGNIFICANT CHANGE UP (ref 3.8–10.5)

## 2023-01-06 ENCOUNTER — RESULT REVIEW (OUTPATIENT)
Age: 68
End: 2023-01-06

## 2023-01-06 ENCOUNTER — APPOINTMENT (OUTPATIENT)
Dept: HEMATOLOGY ONCOLOGY | Facility: CLINIC | Age: 68
End: 2023-01-06

## 2023-01-06 LAB
BASOPHILS # BLD AUTO: 0 K/UL — SIGNIFICANT CHANGE UP (ref 0–0.2)
BASOPHILS NFR BLD AUTO: 0.4 % — SIGNIFICANT CHANGE UP (ref 0–2)
EOSINOPHIL # BLD AUTO: 0 K/UL — SIGNIFICANT CHANGE UP (ref 0–0.5)
EOSINOPHIL NFR BLD AUTO: 0.6 % — SIGNIFICANT CHANGE UP (ref 0–6)
HCT VFR BLD CALC: 45.7 % — SIGNIFICANT CHANGE UP (ref 39–50)
HGB BLD-MCNC: 15.6 G/DL — SIGNIFICANT CHANGE UP (ref 13–17)
LYMPHOCYTES # BLD AUTO: 1.9 K/UL — SIGNIFICANT CHANGE UP (ref 1–3.3)
LYMPHOCYTES # BLD AUTO: 29.7 % — SIGNIFICANT CHANGE UP (ref 13–44)
MCHC RBC-ENTMCNC: 31.7 PG — SIGNIFICANT CHANGE UP (ref 27–34)
MCHC RBC-ENTMCNC: 34.2 G/DL — SIGNIFICANT CHANGE UP (ref 32–36)
MCV RBC AUTO: 92.7 FL — SIGNIFICANT CHANGE UP (ref 80–100)
MONOCYTES # BLD AUTO: 0.5 K/UL — SIGNIFICANT CHANGE UP (ref 0–0.9)
MONOCYTES NFR BLD AUTO: 7.6 % — SIGNIFICANT CHANGE UP (ref 2–14)
NEUTROPHILS # BLD AUTO: 4 K/UL — SIGNIFICANT CHANGE UP (ref 1.8–7.4)
NEUTROPHILS NFR BLD AUTO: 61.7 % — SIGNIFICANT CHANGE UP (ref 43–77)
PLATELET # BLD AUTO: 116 K/UL — LOW (ref 150–400)
RBC # BLD: 4.92 M/UL — SIGNIFICANT CHANGE UP (ref 4.2–5.8)
RBC # FLD: 12.3 % — SIGNIFICANT CHANGE UP (ref 10.3–14.5)
WBC # BLD: 6.5 K/UL — SIGNIFICANT CHANGE UP (ref 3.8–10.5)
WBC # FLD AUTO: 6.5 K/UL — SIGNIFICANT CHANGE UP (ref 3.8–10.5)

## 2023-01-11 ENCOUNTER — APPOINTMENT (OUTPATIENT)
Dept: CARDIOLOGY | Facility: CLINIC | Age: 68
End: 2023-01-11
Payer: MEDICARE

## 2023-01-11 VITALS
OXYGEN SATURATION: 97 % | HEIGHT: 72 IN | SYSTOLIC BLOOD PRESSURE: 122 MMHG | BODY MASS INDEX: 18.15 KG/M2 | DIASTOLIC BLOOD PRESSURE: 79 MMHG | WEIGHT: 134 LBS | TEMPERATURE: 98.2 F | HEART RATE: 92 BPM

## 2023-01-11 PROCEDURE — 99215 OFFICE O/P EST HI 40 MIN: CPT

## 2023-01-11 NOTE — ASSESSMENT
[FreeTextEntry1] : 68 y/o male with RLE claudication , Mark IIB , lifestyle limiting \par U/S showed  of right SFA and moderate to severe disease of the left SFA \par KEKE showed moderate ischemia on the right \par will continue ASA 81 mg \par now that platelets are [id number] , will proceed with peripheral angiogram. \par will obtaion CBC 2 days prior to the procedure\par

## 2023-01-11 NOTE — HISTORY OF PRESENT ILLNESS
[FreeTextEntry1] : 66 y/o male with PMHx of atypical chest pain but has negative stress test and normal EF on echo \par Referred to me because of right LE claudication \par For the last 2-3 months , he has been experiencing Right Foot pain when he walks < 1/2 block \par pain will go away when he stops but sometimes he walks through the pain and it gets better \par He sometimes c/o of the same pain at rest \par no ulcers, no non healing wounds , no numbness/tingling \par KEKE showed moderate ischemia in RLE \par U/S showed  of the right distal SFA. \par \par 1/11/2023\par Returns for follow up . didn't have peripheral angiogram because of thrombocytopenia .\par took dexamethasone and platelets improved to 116 \par still c/o of right foot pain when he walks < 1/2 block \par \par

## 2023-01-11 NOTE — PHYSICAL EXAM
[Normal Appearance] : normal appearance [No Deformities] : no deformities [Abnormal Walk] : normal gait [Nail Clubbing] : no clubbing of the fingernails [No Venous Stasis] : no venous stasis [No Skin Ulcers] : no skin ulcer [Oriented To Time, Place, And Person] : oriented to person, place, and time [Affect] : the affect was normal [Mood] : the mood was normal [FreeTextEntry1] : right DP and PT not palpable, Left DP and PT + 1 , right DP dopplerable monophaic

## 2023-01-19 RX ORDER — CHLORHEXIDINE GLUCONATE 213 G/1000ML
1 SOLUTION TOPICAL ONCE
Refills: 0 | Status: DISCONTINUED | OUTPATIENT
Start: 2023-01-20 | End: 2023-01-21

## 2023-01-19 NOTE — H&P PST ADULT - PRIMARY CARE PROVIDER
PMD: Dr. Erwin Napier Erwin Napier ([address], [place] [address], Phone: [phone], Fax: [phone])

## 2023-01-19 NOTE — H&P PST ADULT - OTHER CARE PROVIDERS
Dr. Spencer Walker Pineda/Dayne Spencer ([facility], [address], [place] [address], Phone: [phone], Fax: [phone])

## 2023-01-19 NOTE — H&P PST ADULT - HISTORY OF PRESENT ILLNESS
Narrative: 67 year old male with pMH of HLD, GERD, COPD, PAD, thrombocytopenia, atypical chest pain with negative stress, referred for persistent RLE claudication. For the past 2-3 months c/o right foot pain when he walks <1/2 block, pain is relieved with rest. Occasionally patient reports rest pain, no ulcers, paresthesia, or non healing wounds. KEKE showed moderate ischemia in RLE and U/S showed  of right distal SFA. Prior angiogram was cancelled 2/2 thrombocytopenia, improved with steroids. Now presents for peripheral angiogram with possible angioplasty and stent of RLE      Mark Classification:   Class I: Asymptomatic   Class II a: Mild claudication  Walking > 200 meters (2.5 blocks)   Class IIb: Moderate to Severe claudication Walking < 200 meters (2.5 blocks)   Class III: Rest Pain   Classd IV: Ulceration or gangrene      Associated Risk Factors:     Age: 67    DM: N/A    Smoking history: N/A    Hyperlipidemia: Yes     Family history of PAD: N/A     Known Athlerosclerotic disease(coronary, carotid, subclavian, renal, mesenteric, AAA):    Antiplatelets/Anticoagulants:        Plavix:         Cilostazol:        pentoxifylinne:        NOAC:     Vascular testing:     KEKE(Normal/Borderline/Abnormal/Noncompressible):       Noncompressable: > 1.4       Normal: 1.0 to 1.4       Borderline: 0.91 to 0.99       Abnormal: < 0.91(0.66 on right)    Arterial Dopplers:    CTA/MRi:    Social History:        Marital:         Tobacco:        ETOH:        Caffeine:     ROS:  CONSTITUTIONAL: No weakness, fevers or chills  EYES/ENT: No visual changes;  No vertigo or throat pain   NECK: No pain or stiffness  RESPIRATORY: No cough, wheezing, hemoptysis; No shortness of breath  CARDIOVASCULAR: No chest pain or palpitations  GASTROINTESTINAL: No abdominal or epigastric pain. No nausea, vomiting, or hematemesis; No diarrhea or constipation. No melena or hematochezia.  GENITOURINARY: No dysuria, frequency or hematuria  NEUROLOGICAL: No numbness or weakness  SKIN: No itching, burning, rashes, or lesions   All other review of systems is negative unless indicated above    PHYSICAL EXAM:    Vital Signs Last 24 Hrs  T(C): --  T(F): --  HR: --  BP: --  BP(mean): --  RR: --  SpO2: --        GENERAL: Pt lying comfortably, NAD.  ENMT: PERRL, +EOMI.  NECK: soft, Supple, No JVD,   CHEST/LUNG: Clear to auscultatation bilaterally; No wheezing.  HEART: S1S2+, Regular rate and rhythm; No murmurs.  ABDOMEN: Soft, Nontender, Nondistended; Bowel sounds present.  MUSCULOSKELETAL: Normal range of motion.  SKIN: No rashes or lesions.  NEURO: AAOX3, no focal deficits, no motor r sensory loss.  PSYCH: normal mood.  VASCULAR:   Radial +2 R/+2 L  Femoral +2 R/+2 L  PT +2 R/+2 L  DP +2 R/+2 L    EKG:      67 year old male current smoker with pMH of HLD, GERD, COPD, PAD, thrombocytopenia, atypical chest pain with negative stress, referred for persistent RLE claudication. For the past 2-3 months c/o right foot pain when he walks approximately 1 block, pain is relieved with rest. Occasionally patient reports rest pain, no ulcers, paresthesia, or non healing wounds. KEKE showed moderate ischemia in RLE and U/S showed  of right distal SFA. Prior angiogram was cancelled 2/2 thrombocytopenia, improved with steroids. Now presents for peripheral angiogram with possible angioplasty and stent of RLE    Mark Classification:   Class I: Asymptomatic   Class II a: Mild claudication  Walking > 200 meters (2.5 blocks)   Class IIb: Moderate to Severe claudication Walking < 200 meters (2.5 blocks)   Class III: Rest Pain   Classd IV: Ulceration or gangrene    Associated Risk Factors:     Age: 67    DM: N/A    Smoking history: Current 1 ppd smoker    Hyperlipidemia: Yes     Family history of PAD: N/A     Known Atherosclerotic disease(coronary, carotid, subclavian, renal, mesenteric, AAA):    Antiplatelets/Anticoagulants:        Plavix: N/A       Cilostazol: N/A       pentoxifylinne: N/A       NOAC: N/A    Vascular testing:     KEKE: 10/7/2022                                     Right          Left  Brachial:                       113            104  Thigh:                          89 (0.79)     139 (1.23)           Calf:                             84 (0.74)    117 (1.04)  Ankle (PT):                    71 (0.63)           Ankle (DP):                    75 (0.66)     125 (1.11)  Transmetatarsal:           70 (0.62)     130 (1.15)    Arterial Dopplers: 11/3/2022  Right:        Ext. Iliac: 119 (Biphasic)       CFA: 93 (Biphasic)       SFA: Proximal: 76 (Biphasic), Mid: 51 (Biphasic), Distal: Absent       DFA: 265 (Triphasic)       Popliteal: 20 (Blunted)       TPT: 28 (Blunted)       ANSELMO: 16 (Blunted)       PTA: 31 (Blunted)       Peroneal: 18 (Blunted)  Left:        Ext. Iliac: 150 (Triphasic)       CFA: 73 (Biphasic)       SFA: Proximal: 133 (Biphasic), Mid: 62 (Biphasic), Distal: 279 (Biphasic)       DFA: 72 (Biphasic)       Popliteal: 58 (Biphasic)       TPT: 38 (Biphasic)       ANSELMO: 30 (Biphasic)       PTA: 206 (Biphasic)       Peroneal: 49 (Blunted)      CTA/MRi: N/A    Social History:        Marital:  Single, lives alone       Tobacco: Current 1 ppd smoker for 30 years       ETOH: Denies       Caffeine: 2-3 cups coffee/tea daily    ROS:  CONSTITUTIONAL: No weakness, fevers or chills  EYES/ENT: No visual changes;  No vertigo or throat pain   NECK: No pain or stiffness  RESPIRATORY: No cough, wheezing, hemoptysis; + shortness of breath  CARDIOVASCULAR: Occasional chest pain, no palpitations, + BOONE, + claudication  GASTROINTESTINAL: No abdominal or epigastric pain. No nausea, vomiting, or hematemesis; No diarrhea or constipation. No melena or hematochezia.  GENITOURINARY: No dysuria, frequency or hematuria  NEUROLOGICAL: No numbness or weakness  SKIN: No itching, burning, rashes, or lesions   All other review of systems is negative unless indicated above    PHYSICAL EXAM:    Vital Signs Last 24 Hrs  T(C): --  T(C): 36.4 (01-20-23 @ 09:36), Max: 36.4 (01-20-23 @ 09:36)  HR: 60 (01-20-23 @ 09:36)  BP: 135/83 (01-20-23 @ 09:36)  RR: 16 (01-20-23 @ 09:36)  SpO2: 97% (01-20-23 @ 09:36)    GENERAL: Pt lying comfortably, NAD.  ENMT: PERRL, +EOMI.  NECK: soft, Supple, No JVD,   CHEST/LUNG: Clear to auscultation bilaterally; No wheezing.  HEART: S1S2+, Regular rate and rhythm; No murmurs.  ABDOMEN: Soft, Nontender, Nondistended; Bowel sounds present.  MUSCULOSKELETAL: Normal range of motion.  SKIN: No rashes or lesions.  NEURO: AAOX3, no focal deficits, no motor r sensory loss.  PSYCH: normal mood.  VASCULAR:   Radial +1 R/+2 L  Femoral +2 R/+2 L  DP +1 R/+2 L    EKG:

## 2023-01-19 NOTE — H&P PST ADULT - NSICDXPROCEDURE_GEN_ALL_CORE_FT
PROCEDURES:  Angiography of right femoral vessel 20-Jan-2023 10:37:06 Possible stenting Yoni Rivera

## 2023-01-19 NOTE — H&P PST ADULT - ASSESSMENT
Risk Assessments:  ASA:  Mallampati:  GFR:   Cr:  BRA:      Impression: 67 year old male with pMH of HLD, GERD, COPD, PAD, thrombocytopenia, atypical chest pain with negative stress, referred for persistent RLE claudication. For the past 2-3 months c/o right foot pain when he walks <1/2 block, pain is relieved with rest. Occasionally patient reports rest pain, no ulcers, paresthesia, or non healing wounds. KEKE showed moderate ischemia in RLE and U/S showed  of right distal SFA. Prior angiogram was cancelled 2/2 thrombocytopenia, improved with steroids. Now presents for peripheral angiogram with possible angioplasty and stent of RLE        Plan:    -plan for *** via ***  -patient seen and examined  -confirmed appropriate NPO duration  -ECG and Labs reviewed  -Aspirin 81mg po pre-cath  -Normal Saline 0.9%  250ml/hr IV: pre procedure SERGIO ppx   -procedure discussed with patient; risks and benefits explained, questions answered  -consent obtained by attending  Assessment: 67 year old male current smoker with pMH of HLD, GERD, COPD, PAD, thrombocytopenia, atypical chest pain with negative stress, referred for persistent RLE claudication. For the past 2-3 months c/o right foot pain when he walks approximately 1 block, pain is relieved with rest. Occasionally patient reports rest pain, no ulcers, paresthesia, or non healing wounds. KEKE showed moderate ischemia in RLE and U/S showed  of right distal SFA. Prior angiogram was cancelled 2/2 thrombocytopenia, improved with steroids. Now presents for peripheral angiogram with possible angioplasty and stent of RLE    ASA: 3  Mallampati: 2  COVID: Negative  ABR: 1.7%  GFR: 84  Creatinine: 1.0    1. PAD with claudication  ·	RLE angiography and possible intervention  ·	 mL bolus  ·	Patient took aspirin today    Discharge Planning:   ·	Discharge: In AM  ·	Follow up with: Dr Spencer

## 2023-01-20 ENCOUNTER — TRANSCRIPTION ENCOUNTER (OUTPATIENT)
Age: 68
End: 2023-01-20

## 2023-01-20 ENCOUNTER — INPATIENT (INPATIENT)
Facility: HOSPITAL | Age: 68
LOS: 0 days | Discharge: HOME | DRG: 253 | End: 2023-01-21
Attending: INTERNAL MEDICINE | Admitting: INTERNAL MEDICINE
Payer: MEDICARE

## 2023-01-20 VITALS
HEART RATE: 60 BPM | RESPIRATION RATE: 16 BRPM | OXYGEN SATURATION: 97 % | TEMPERATURE: 98 F | DIASTOLIC BLOOD PRESSURE: 83 MMHG | SYSTOLIC BLOOD PRESSURE: 135 MMHG

## 2023-01-20 DIAGNOSIS — R68.89 OTHER GENERAL SYMPTOMS AND SIGNS: ICD-10-CM

## 2023-01-20 DIAGNOSIS — Z98.890 OTHER SPECIFIED POSTPROCEDURAL STATES: Chronic | ICD-10-CM

## 2023-01-20 LAB
ALBUMIN SERPL ELPH-MCNC: 4.3 G/DL — SIGNIFICANT CHANGE UP (ref 3.3–5.2)
ALP SERPL-CCNC: 97 U/L — SIGNIFICANT CHANGE UP (ref 40–120)
ALT FLD-CCNC: 28 U/L — SIGNIFICANT CHANGE UP
ANION GAP SERPL CALC-SCNC: 8 MMOL/L — SIGNIFICANT CHANGE UP (ref 5–17)
AST SERPL-CCNC: 25 U/L — SIGNIFICANT CHANGE UP
BASOPHILS # BLD AUTO: 0.02 K/UL — SIGNIFICANT CHANGE UP (ref 0–0.2)
BASOPHILS NFR BLD AUTO: 0.3 % — SIGNIFICANT CHANGE UP (ref 0–2)
BILIRUB SERPL-MCNC: 0.4 MG/DL — SIGNIFICANT CHANGE UP (ref 0.4–2)
BLD GP AB SCN SERPL QL: SIGNIFICANT CHANGE UP
BUN SERPL-MCNC: 16.3 MG/DL — SIGNIFICANT CHANGE UP (ref 8–20)
CALCIUM SERPL-MCNC: 9.3 MG/DL — SIGNIFICANT CHANGE UP (ref 8.4–10.5)
CHLORIDE SERPL-SCNC: 106 MMOL/L — SIGNIFICANT CHANGE UP (ref 96–108)
CHOLEST SERPL-MCNC: 195 MG/DL — SIGNIFICANT CHANGE UP
CO2 SERPL-SCNC: 28 MMOL/L — SIGNIFICANT CHANGE UP (ref 22–29)
CREAT SERPL-MCNC: 1 MG/DL — SIGNIFICANT CHANGE UP (ref 0.5–1.3)
EGFR: 84 ML/MIN/1.73M2 — SIGNIFICANT CHANGE UP
EOSINOPHIL # BLD AUTO: 0.08 K/UL — SIGNIFICANT CHANGE UP (ref 0–0.5)
EOSINOPHIL NFR BLD AUTO: 1.2 % — SIGNIFICANT CHANGE UP (ref 0–6)
GLUCOSE SERPL-MCNC: 103 MG/DL — HIGH (ref 70–99)
HCT VFR BLD CALC: 43.9 % — SIGNIFICANT CHANGE UP (ref 39–50)
HDLC SERPL-MCNC: 54 MG/DL — SIGNIFICANT CHANGE UP
HGB BLD-MCNC: 14.8 G/DL — SIGNIFICANT CHANGE UP (ref 13–17)
IMM GRANULOCYTES NFR BLD AUTO: 0.1 % — SIGNIFICANT CHANGE UP (ref 0–0.9)
LIPID PNL WITH DIRECT LDL SERPL: 122 MG/DL — HIGH
LYMPHOCYTES # BLD AUTO: 2.29 K/UL — SIGNIFICANT CHANGE UP (ref 1–3.3)
LYMPHOCYTES # BLD AUTO: 33.7 % — SIGNIFICANT CHANGE UP (ref 13–44)
MCHC RBC-ENTMCNC: 31.2 PG — SIGNIFICANT CHANGE UP (ref 27–34)
MCHC RBC-ENTMCNC: 33.7 GM/DL — SIGNIFICANT CHANGE UP (ref 32–36)
MCV RBC AUTO: 92.6 FL — SIGNIFICANT CHANGE UP (ref 80–100)
MONOCYTES # BLD AUTO: 0.51 K/UL — SIGNIFICANT CHANGE UP (ref 0–0.9)
MONOCYTES NFR BLD AUTO: 7.5 % — SIGNIFICANT CHANGE UP (ref 2–14)
NEUTROPHILS # BLD AUTO: 3.89 K/UL — SIGNIFICANT CHANGE UP (ref 1.8–7.4)
NEUTROPHILS NFR BLD AUTO: 57.2 % — SIGNIFICANT CHANGE UP (ref 43–77)
NON HDL CHOLESTEROL: 141 MG/DL — HIGH
PLATELET # BLD AUTO: 110 K/UL — LOW (ref 150–400)
POTASSIUM SERPL-MCNC: 4.1 MMOL/L — SIGNIFICANT CHANGE UP (ref 3.5–5.3)
POTASSIUM SERPL-SCNC: 4.1 MMOL/L — SIGNIFICANT CHANGE UP (ref 3.5–5.3)
PROT SERPL-MCNC: 7.2 G/DL — SIGNIFICANT CHANGE UP (ref 6.6–8.7)
RBC # BLD: 4.74 M/UL — SIGNIFICANT CHANGE UP (ref 4.2–5.8)
RBC # FLD: 14.5 % — SIGNIFICANT CHANGE UP (ref 10.3–14.5)
SODIUM SERPL-SCNC: 142 MMOL/L — SIGNIFICANT CHANGE UP (ref 135–145)
TRIGL SERPL-MCNC: 97 MG/DL — SIGNIFICANT CHANGE UP
WBC # BLD: 6.8 K/UL — SIGNIFICANT CHANGE UP (ref 3.8–10.5)
WBC # FLD AUTO: 6.8 K/UL — SIGNIFICANT CHANGE UP (ref 3.8–10.5)

## 2023-01-20 PROCEDURE — 37226: CPT | Mod: LT

## 2023-01-20 PROCEDURE — 75716 ARTERY X-RAYS ARMS/LEGS: CPT | Mod: 26,XU

## 2023-01-20 PROCEDURE — 99152 MOD SED SAME PHYS/QHP 5/>YRS: CPT

## 2023-01-20 RX ORDER — CILOSTAZOL 100 MG/1
100 TABLET ORAL
Refills: 0 | Status: DISCONTINUED | OUTPATIENT
Start: 2023-01-21 | End: 2023-01-21

## 2023-01-20 RX ORDER — FAMOTIDINE 10 MG/ML
20 INJECTION INTRAVENOUS DAILY
Refills: 0 | Status: DISCONTINUED | OUTPATIENT
Start: 2023-01-20 | End: 2023-01-21

## 2023-01-20 RX ORDER — ALBUTEROL 90 UG/1
2 AEROSOL, METERED ORAL EVERY 6 HOURS
Refills: 0 | Status: DISCONTINUED | OUTPATIENT
Start: 2023-01-20 | End: 2023-01-21

## 2023-01-20 RX ORDER — CYCLOBENZAPRINE HYDROCHLORIDE 10 MG/1
1 TABLET, FILM COATED ORAL
Qty: 0 | Refills: 0 | DISCHARGE

## 2023-01-20 RX ORDER — ASPIRIN/CALCIUM CARB/MAGNESIUM 324 MG
81 TABLET ORAL DAILY
Refills: 0 | Status: DISCONTINUED | OUTPATIENT
Start: 2023-01-20 | End: 2023-01-21

## 2023-01-20 RX ORDER — SODIUM CHLORIDE 9 MG/ML
250 INJECTION INTRAMUSCULAR; INTRAVENOUS; SUBCUTANEOUS ONCE
Refills: 0 | Status: COMPLETED | OUTPATIENT
Start: 2023-01-20 | End: 2023-01-20

## 2023-01-20 RX ORDER — CLOPIDOGREL BISULFATE 75 MG/1
600 TABLET, FILM COATED ORAL ONCE
Refills: 0 | Status: COMPLETED | OUTPATIENT
Start: 2023-01-20 | End: 2023-01-20

## 2023-01-20 RX ORDER — FAMOTIDINE 10 MG/ML
10 INJECTION INTRAVENOUS
Qty: 0 | Refills: 0 | DISCHARGE

## 2023-01-20 RX ORDER — IPRATROPIUM/ALBUTEROL SULFATE 18-103MCG
0 AEROSOL WITH ADAPTER (GRAM) INHALATION
Qty: 0 | Refills: 0 | DISCHARGE

## 2023-01-20 RX ORDER — TIOTROPIUM BROMIDE 18 UG/1
2 CAPSULE ORAL; RESPIRATORY (INHALATION) DAILY
Refills: 0 | Status: DISCONTINUED | OUTPATIENT
Start: 2023-01-20 | End: 2023-01-21

## 2023-01-20 RX ORDER — CLOPIDOGREL BISULFATE 75 MG/1
75 TABLET, FILM COATED ORAL DAILY
Refills: 0 | Status: DISCONTINUED | OUTPATIENT
Start: 2023-01-21 | End: 2023-01-21

## 2023-01-20 RX ORDER — PREGABALIN 225 MG/1
1 CAPSULE ORAL
Qty: 0 | Refills: 0 | DISCHARGE

## 2023-01-20 RX ORDER — ATORVASTATIN CALCIUM 80 MG/1
40 TABLET, FILM COATED ORAL AT BEDTIME
Refills: 0 | Status: DISCONTINUED | OUTPATIENT
Start: 2023-01-20 | End: 2023-01-21

## 2023-01-20 RX ORDER — CILOSTAZOL 100 MG/1
100 TABLET ORAL
Refills: 0 | Status: DISCONTINUED | OUTPATIENT
Start: 2023-01-20 | End: 2023-01-20

## 2023-01-20 RX ADMIN — CLOPIDOGREL BISULFATE 600 MILLIGRAM(S): 75 TABLET, FILM COATED ORAL at 21:01

## 2023-01-20 RX ADMIN — ATORVASTATIN CALCIUM 40 MILLIGRAM(S): 80 TABLET, FILM COATED ORAL at 21:01

## 2023-01-20 RX ADMIN — FAMOTIDINE 20 MILLIGRAM(S): 10 INJECTION INTRAVENOUS at 21:01

## 2023-01-20 RX ADMIN — SODIUM CHLORIDE 250 MILLILITER(S): 9 INJECTION INTRAMUSCULAR; INTRAVENOUS; SUBCUTANEOUS at 13:40

## 2023-01-20 NOTE — DISCHARGE NOTE PROVIDER - NSDCCPCAREPLAN_GEN_ALL_CORE_FT
PRINCIPAL DISCHARGE DIAGNOSIS  Diagnosis: PAD (peripheral artery disease)  Assessment and Plan of Treatment:       SECONDARY DISCHARGE DIAGNOSES  Diagnosis: Pure hypercholesterolemia  Assessment and Plan of Treatment:   LDL Goal: NonHDL < 80, LDL < 55  Lipid Panel: Not at goal  Statin: Lipitor 40 mg daily  Other: CoQ10 100 mg daily     PRINCIPAL DISCHARGE DIAGNOSIS  Diagnosis: PAD (peripheral artery disease)  Assessment and Plan of Treatment:   DAPT: Plavix 75mg daily and aspirin 81mg daily.  Other: Cilostazol 100 mg BID.  Statin: Start Lipitor 40 mg daily  Aggressive lifestyle modification and risk factor reduction.      SECONDARY DISCHARGE DIAGNOSES  Diagnosis: Pure hypercholesterolemia  Assessment and Plan of Treatment:   LDL Goal: NonHDL < 80, LDL < 55  Lipid Panel: Not at goal  Statin: Lipitor 40 mg daily  Other: CoQ10 100 mg daily

## 2023-01-20 NOTE — DISCHARGE NOTE PROVIDER - HOSPITAL COURSE
67 year old male current smoker with pMH of HLD, GERD, COPD, PAD, thrombocytopenia, atypical chest pain with negative stress, referred for persistent RLE claudication. For the past 2-3 months c/o right foot pain when he walks approximately 1 block, pain is relieved with rest. Occasionally patient reports rest pain, no ulcers, paresthesia, or non healing wounds. KEKE showed moderate ischemia in RLE and U/S showed  of right distal SFA. Prior angiogram was cancelled 2/2 thrombocytopenia, improved with steroids. Now presents for peripheral angiogram with possible angioplasty and stent of RLE. He had a RLE angiography, Balloon angioplasty (5.0 mm X 80 mm X 135 cm [place]), DCB (6.0 mm X 150 mm X 130 cm Lutonix DCB), and stent (6.0 mm X 120 mm X 130 cm Innova SE stent). He had 2 vessel runoff post procedure.

## 2023-01-20 NOTE — PROGRESS NOTE ADULT - ASSESSMENT
67y Male    Assessment and Plan:     1. PAD, S/P RLE angiography  ·	Pull sheath at: When ACT < 180  ·	Bedrest for: 2 hours post sheath pull  ·	DAPT: Plavix 75mg daily and Aspirin 81mg daily  ·	Statin: Start Lipitor 40 mg daily  ·	Aggressive lifestyle modification and risk factor reduction.   ·	CBC, BMP, Mg, HgbA1C in AM  ·	 mL IV bolus    2. HLD  ·	LDL Goal: NonHDL < 80, LDL < 55  ·	Lipid Panel: Not at goal  ·	Statin: Lipitor 40 mg daily  ·	Other: CoQ10 100 mg daily      Discharge Planning:   ·	Probable discharge in AM  ·	Follow up as an outpatient with: Dr. Spencer   67y Male    Assessment and Plan:     1. PAD, S/P RLE angiography  ·	Pull sheath at: When ACT < 180  ·	Bedrest for: 2 hours post sheath pull  ·	DAPT: The patient was loaded with Brilinta 180 mg in the CCL. will load him with Plavix 600 mg at 8:00PM, then tomorrow start Plavix 75mg daily and aspirin 81mg daily.  ·	Other: Cilostazol 100 mg BID.  ·	Statin: Start Lipitor 40 mg daily  ·	Aggressive lifestyle modification and risk factor reduction.   ·	CBC, BMP, Mg, HgbA1C in AM  ·	 mL IV bolus    2. HLD  ·	LDL Goal: NonHDL < 80, LDL < 55  ·	Lipid Panel: Not at goal  ·	Statin: Lipitor 40 mg daily  ·	Other: CoQ10 100 mg daily      Discharge Planning:   ·	Probable discharge in AM  ·	Follow up as an outpatient with: Dr. Spencer

## 2023-01-20 NOTE — ASU PATIENT PROFILE, ADULT - FALL HARM RISK - UNIVERSAL INTERVENTIONS
Bed in lowest position, wheels locked, appropriate side rails in place/Call bell, personal items and telephone in reach/Instruct patient to call for assistance before getting out of bed or chair/Non-slip footwear when patient is out of bed/[place] to call system/Physically safe environment - no spills, clutter or unnecessary equipment/Purposeful Proactive Rounding/Room/bathroom lighting operational, light cord in reach

## 2023-01-20 NOTE — ASU PATIENT PROFILE, ADULT - NSICDXPASTMEDICALHX_GEN_ALL_CORE_FT
PAST MEDICAL HISTORY:  Back pain     COPD (chronic obstructive pulmonary disease)     No pertinent past medical history

## 2023-01-20 NOTE — DISCHARGE NOTE PROVIDER - CARE PROVIDER_API CALL
Dayne Spencer)  Cardiovascular Disease  [address] [facility], [address] [place] [id number]  Phone: [phone]  Fax: [phone]  Established Patient  Follow Up Time: 2 weeks

## 2023-01-20 NOTE — DISCHARGE NOTE PROVIDER - NSDCCPTREATMENT_GEN_ALL_CORE_FT
PRINCIPAL PROCEDURE  Procedure: Angioplasty of femoral artery with stent insertion  Findings and Treatment: RLE angiography, Balloon angioplasty (5.0 mm X 80 mm X 135 cm [place]), DCB (6.0 mm X 150 mm X 130 cm Lutonix DCB), and stent (6.0 mm X 120 mm X 130 cm Innova SE stent).

## 2023-01-20 NOTE — PROGRESS NOTE ADULT - SUBJECTIVE AND OBJECTIVE BOX
Department of Cardiology                                                                  [facility]/[place] [address] [facility]                                                            Telephone: [phone]. Fax:[phone]                                                     INTERVENTIONAL CARDIOLOGY CATHETERIZATION NOTE       Subjective:  67y  Male who had a left heart catheterization which showed:           Access: Left femoral artery       Hemostasis: Manual pull       Total Contrast:        Total Heparin:        Antiplatelet Given:     PAST MEDICAL & SURGICAL HISTORY:  COPD (chronic obstructive pulmonary disease)  Back pain  Mixed hyperlipidemia  Thrombocytopenia  History of right knee surgery    FAMILY HISTORY:  No pertinent family history    Home Medications:  aspirin 81 mg oral tablet, chewable: 1 tab(s) orally once a day (20 Jan 2023 09:42)  Combivent Respimat 20 mcg-100 mcg/inh inhalation aerosol: inhaled 2 times a day (20 Jan 2023 09:42)  cyclobenzaprine 5 mg oral tablet: 1 tab(s) orally once a day (20 Jan 2023 09:42)  Pepcid: 10  orally once a day (20 Jan 2023 09:42)  Vitamin B12 50 mcg oral tablet: 1 tab(s) orally once a day (20 Jan 2023 09:42)    HPI: 67 year old male current smoker with pMH of HLD, GERD, COPD, PAD, thrombocytopenia, atypical chest pain with negative stress, referred for persistent RLE claudication. For the past 2-3 months c/o right foot pain when he walks approximately 1 block, pain is relieved with rest. Occasionally patient reports rest pain, no ulcers, paresthesia, or non healing wounds. KEKE showed moderate ischemia in RLE and U/S showed  of right distal SFA. Prior angiogram was cancelled 2/2 thrombocytopenia, improved with steroids. Now presents for peripheral angiogram with possible angioplasty and stent of RLE    General: No fatigue, no fevers/chills  Respiratory: No dyspnea, no cough, no wheeze  CV: No chest pain, no palpitations  Abd: No nausea  Neuro: No headache, no dizziness    No Known Allergies    Objective:  Vital Signs Last 24 Hrs  T(C): 36.4 (20 Jan 2023 09:36), Max: 36.4 (20 Jan 2023 09:36)  T(F): 97.6 (20 Jan 2023 09:36), Max: 97.6 (20 Jan 2023 09:36)  HR: 61 (20 Jan 2023 14:45) (60 - 61)  BP: 124/76 (20 Jan 2023 14:45) (121/76 - 135/83)  RR: 16 (20 Jan 2023 14:45) (16 - 16)  SpO2: 97% (20 Jan 2023 14:45) (97% - 97%)    Parameters below as of 20 Jan 2023 14:45  Patient On (Oxygen Delivery Method): room air    General: Awake, alert, speech clear, in no acute distress.  Chest: CTA, S1, S2, no murmurs.  Abdomen, Soft, nondistended  Left Groin: Soft, no bleeding, no hematoma.  Extremities: No edema, + distal pulses.                            14.8   6.80  )-----------( 110      ( 20 Jan 2023 09:19 )             43.9     01-20    142  |  106  |  16.3  ----------------------------<  103  4.1   |  28.0  |  1.00    Ca    9.3      20 Jan 2023 09:19    TPro  7.2  /  Alb  4.3  /  TBili  0.4  /  DBili  x   /  AST  25  /  ALT  28  /  AlkPhos  97  01-20    Lipids       Total Cholesterol: 195       Triglycerides: 97       HDL: 54       Non-HDL: 141       LDL: 122                                                                              Department of Cardiology                                                                  [facility]/[place] [address] [address] [facility]                                                            Telephone: [phone]. Fax:[phone]                                                     INTERVENTIONAL CARDIOLOGY CATHETERIZATION NOTE       Subjective:  67y  Male who had a RLE angiography, Balloon angioplasty (5.0 mm X 80 mm X 135 cm [place]), DCB (6.0 mm X 150 mm X 130 cm Lutonix DCB), and stent (6.0 mm X 120 mm X 130 cm Innova SE stent).         Access: Left femoral artery       Hemostasis: Manual pull       Total Contrast: 75 mL Visipaque       Total Heparin: 5,000 units       Antiplatelet Given: Brilinta 180 mg    PAST MEDICAL & SURGICAL HISTORY:  COPD (chronic obstructive pulmonary disease)  Back pain  Mixed hyperlipidemia  Thrombocytopenia  History of right knee surgery    FAMILY HISTORY:  No pertinent family history    Home Medications:  aspirin 81 mg oral tablet, chewable: 1 tab(s) orally once a day (20 Jan 2023 09:42)  Combivent Respimat 20 mcg-100 mcg/inh inhalation aerosol: inhaled 2 times a day (20 Jan 2023 09:42)  cyclobenzaprine 5 mg oral tablet: 1 tab(s) orally once a day (20 Jan 2023 09:42)  Pepcid: 10  orally once a day (20 Jan 2023 09:42)  Vitamin B12 50 mcg oral tablet: 1 tab(s) orally once a day (20 Jan 2023 09:42)    HPI: 67 year old male current smoker with pMH of HLD, GERD, COPD, PAD, thrombocytopenia, atypical chest pain with negative stress, referred for persistent RLE claudication. For the past 2-3 months c/o right foot pain when he walks approximately 1 block, pain is relieved with rest. Occasionally patient reports rest pain, no ulcers, paresthesia, or non healing wounds. KEKE showed moderate ischemia in RLE and U/S showed  of right distal SFA. Prior angiogram was cancelled 2/2 thrombocytopenia, improved with steroids. Now presents for peripheral angiogram with possible angioplasty and stent of RLE    General: No fatigue, no fevers/chills  Respiratory: No dyspnea, no cough, no wheeze  CV: No chest pain, no palpitations  Abd: No nausea  Neuro: No headache, no dizziness    No Known Allergies    Objective:  Vital Signs Last 24 Hrs  T(C): 36.4 (20 Jan 2023 09:36), Max: 36.4 (20 Jan 2023 09:36)  T(F): 97.6 (20 Jan 2023 09:36), Max: 97.6 (20 Jan 2023 09:36)  HR: 61 (20 Jan 2023 14:45) (60 - 61)  BP: 124/76 (20 Jan 2023 14:45) (121/76 - 135/83)  RR: 16 (20 Jan 2023 14:45) (16 - 16)  SpO2: 97% (20 Jan 2023 14:45) (97% - 97%)    Parameters below as of 20 Jan 2023 14:45  Patient On (Oxygen Delivery Method): room air    General: Awake, alert, speech clear, in no acute distress.  Chest: CTA, S1, S2, no murmurs.  Abdomen, Soft, nondistended  Left Groin: Soft, no bleeding, no hematoma.  Extremities: No edema, + distal pulses.                            14.8   6.80  )-----------( 110      ( 20 Jan 2023 09:19 )             43.9     01-20    142  |  106  |  16.3  ----------------------------<  103  4.1   |  28.0  |  1.00    Ca    9.3      20 Jan 2023 09:19    TPro  7.2  /  Alb  4.3  /  TBili  0.4  /  DBili  x   /  AST  25  /  ALT  28  /  AlkPhos  97  01-20    Lipids       Total Cholesterol: 195       Triglycerides: 97       HDL: 54       Non-HDL: 141       LDL: 122                                                                              Department of Cardiology                                                                  [facility]/[place] [address] [facility]                                                            Telephone: [phone]. Fax:[phone]                                                     INTERVENTIONAL CARDIOLOGY CATHETERIZATION NOTE       Subjective:  67y  Male who had a RLE angiography, Balloon angioplasty (5.0 mm X 80 mm X 135 cm [place]), DCB (6.0 mm X 150 mm X 130 cm Lutonix DCB), and stent (6.0 mm X 120 mm X 130 cm Innova SE stent).         Access: Left femoral artery       Hemostasis: Manual pull       Total Contrast: 75 mL Visipaque       Total Heparin: 5,000 units       Antiplatelet Given: Brilinta 180 mg    PAST MEDICAL & SURGICAL HISTORY:  COPD (chronic obstructive pulmonary disease)  Back pain  Mixed hyperlipidemia  Thrombocytopenia  History of right knee surgery    FAMILY HISTORY:  No pertinent family history    Home Medications:  aspirin 81 mg oral tablet, chewable: 1 tab(s) orally once a day (20 Jan 2023 09:42)  Combivent Respimat 20 mcg-100 mcg/inh inhalation aerosol: inhaled 2 times a day (20 Jan 2023 09:42)  cyclobenzaprine 5 mg oral tablet: 1 tab(s) orally once a day (20 Jan 2023 09:42)  Pepcid: 10  orally once a day (20 Jan 2023 09:42)  Vitamin B12 50 mcg oral tablet: 1 tab(s) orally once a day (20 Jan 2023 09:42)    HPI: 67 year old male current smoker with pMH of HLD, GERD, COPD, PAD, thrombocytopenia, atypical chest pain with negative stress, referred for persistent RLE claudication. For the past 2-3 months c/o right foot pain when he walks approximately 1 block, pain is relieved with rest. Occasionally patient reports rest pain, no ulcers, paresthesia, or non healing wounds. KEKE showed moderate ischemia in RLE and U/S showed  of right distal SFA. Prior angiogram was cancelled 2/2 thrombocytopenia, improved with steroids. Now presents for peripheral angiogram with possible angioplasty and stent of RLE    General: No fatigue, no fevers/chills  Respiratory: No dyspnea, no cough, no wheeze  CV: No chest pain, no palpitations  Abd: No nausea  Neuro: No headache, no dizziness    No Known Allergies    Objective:  Vital Signs Last 24 Hrs  T(C): 36.4 (20 Jan 2023 09:36), Max: 36.4 (20 Jan 2023 09:36)  T(F): 97.6 (20 Jan 2023 09:36), Max: 97.6 (20 Jan 2023 09:36)  HR: 61 (20 Jan 2023 14:45) (60 - 61)  BP: 124/76 (20 Jan 2023 14:45) (121/76 - 135/83)  RR: 16 (20 Jan 2023 14:45) (16 - 16)  SpO2: 97% (20 Jan 2023 14:45) (97% - 97%)    Parameters below as of 20 Jan 2023 14:45  Patient On (Oxygen Delivery Method): room air    General: Awake, alert, speech clear, in no acute distress.  Chest: CTA, S1, S2, no murmurs.  Abdomen, Soft, nondistended  Left Groin: Soft, no bleeding, no hematoma.  Extremities: No edema, DP Pulses: Right 1+, Left: 2+.                            14.8   6.80  )-----------( 110      ( 20 Jan 2023 09:19 )             43.9     01-20    142  |  106  |  16.3  ----------------------------<  103  4.1   |  28.0  |  1.00    Ca    9.3      20 Jan 2023 09:19    TPro  7.2  /  Alb  4.3  /  TBili  0.4  /  DBili  x   /  AST  25  /  ALT  28  /  AlkPhos  97  01-20    Lipids       Total Cholesterol: 195       Triglycerides: 97       HDL: 54       Non-HDL: 141       LDL: 122                                                                              Department of Cardiology                                                                  [facility]/[place] [address] [address] [facility]                                                            Telephone: [phone]. Fax:[phone]                                                     INTERVENTIONAL CARDIOLOGY CATHETERIZATION NOTE       Subjective:  67y  Male who had a RLE angiography, Balloon angioplasty (5.0 mm X 80 mm X 135 cm [place]), DCB (6.0 mm X 150 mm X 130 cm Lutonix DCB), and stent (6.0 mm X 120 mm X 130 cm Innova SE stent) of the right SFA.         Access: Left femoral artery       Hemostasis: Manual pull       Total Contrast: 75 mL Visipaque       Total Heparin: 5,000 units       Antiplatelet Given: Brilinta 180 mg    PAST MEDICAL & SURGICAL HISTORY:  COPD (chronic obstructive pulmonary disease)  Back pain  Mixed hyperlipidemia  Thrombocytopenia  History of right knee surgery    FAMILY HISTORY:  No pertinent family history    Home Medications:  aspirin 81 mg oral tablet, chewable: 1 tab(s) orally once a day (20 Jan 2023 09:42)  Combivent Respimat 20 mcg-100 mcg/inh inhalation aerosol: inhaled 2 times a day (20 Jan 2023 09:42)  cyclobenzaprine 5 mg oral tablet: 1 tab(s) orally once a day (20 Jan 2023 09:42)  Pepcid: 10  orally once a day (20 Jan 2023 09:42)  Vitamin B12 50 mcg oral tablet: 1 tab(s) orally once a day (20 Jan 2023 09:42)    HPI: 67 year old male current smoker with pMH of HLD, GERD, COPD, PAD, thrombocytopenia, atypical chest pain with negative stress, referred for persistent RLE claudication. For the past 2-3 months c/o right foot pain when he walks approximately 1 block, pain is relieved with rest. Occasionally patient reports rest pain, no ulcers, paresthesia, or non healing wounds. KEKE showed moderate ischemia in RLE and U/S showed  of right distal SFA. Prior angiogram was cancelled 2/2 thrombocytopenia, improved with steroids. Now presents for peripheral angiogram with possible angioplasty and stent of RLE    General: No fatigue, no fevers/chills  Respiratory: No dyspnea, no cough, no wheeze  CV: No chest pain, no palpitations  Abd: No nausea  Neuro: No headache, no dizziness    No Known Allergies    Objective:  Vital Signs Last 24 Hrs  T(C): 36.4 (20 Jan 2023 09:36), Max: 36.4 (20 Jan 2023 09:36)  T(F): 97.6 (20 Jan 2023 09:36), Max: 97.6 (20 Jan 2023 09:36)  HR: 61 (20 Jan 2023 14:45) (60 - 61)  BP: 124/76 (20 Jan 2023 14:45) (121/76 - 135/83)  RR: 16 (20 Jan 2023 14:45) (16 - 16)  SpO2: 97% (20 Jan 2023 14:45) (97% - 97%)    Parameters below as of 20 Jan 2023 14:45  Patient On (Oxygen Delivery Method): room air    General: Awake, alert, speech clear, in no acute distress.  Chest: CTA, S1, S2, no murmurs.  Abdomen, Soft, nondistended  Left Groin: Soft, no bleeding, no hematoma.  Extremities: No edema, DP Pulses: Right 1+, Left: 2+.                            14.8   6.80  )-----------( 110      ( 20 Jan 2023 09:19 )             43.9     01-20    142  |  106  |  16.3  ----------------------------<  103  4.1   |  28.0  |  1.00    Ca    9.3      20 Jan 2023 09:19    TPro  7.2  /  Alb  4.3  /  TBili  0.4  /  DBili  x   /  AST  25  /  ALT  28  /  AlkPhos  97  01-20    Lipids       Total Cholesterol: 195       Triglycerides: 97       HDL: 54       Non-HDL: 141       LDL: 122

## 2023-01-20 NOTE — DISCHARGE NOTE PROVIDER - NSDCMRMEDTOKEN_GEN_ALL_CORE_FT
aspirin 81 mg oral tablet, chewable: 1 tab(s) orally once a day  Combivent Respimat 20 mcg-100 mcg/inh inhalation aerosol: inhaled 2 times a day  cyclobenzaprine 5 mg oral tablet: 1 tab(s) orally once a day  Pepcid: 10  orally once a day  Vitamin B12 50 mcg oral tablet: 1 tab(s) orally once a day   albuterol 90 mcg/inh inhalation aerosol: 2 puff(s) inhaled every 6 hours  atorvastatin 40 mg oral tablet: 1 tab(s) orally once a day (at bedtime)  cilostazol 100 mg oral tablet: 1 tab(s) orally 2 times a day  clopidogrel 75 mg oral tablet: 1 tab(s) orally once a day  Combivent Respimat 20 mcg-100 mcg/inh inhalation aerosol: inhaled 2 times a day  cyclobenzaprine 5 mg oral tablet: 1 tab(s) orally once a day  Pepcid: 10  orally once a day  Vitamin B12 50 mcg oral tablet: 1 tab(s) orally once a day

## 2023-01-20 NOTE — DISCHARGE NOTE PROVIDER - NSDCFUSCHEDAPPT_GEN_ALL_CORE_FT
Shon Shanks  [facility]  [facility]  Scheduled Appointment: 03/20/2023    Walker Pineda  [facility]  CARDIOLOGY [address]  Scheduled Appointment: 04/05/2023     Leisa Somers  [facility]  CARDIOLOGY [address]  Scheduled Appointment: 02/06/2023    Shon Shanks  [facility]  [facility]  Scheduled Appointment: 03/20/2023    Walker Pineda  [facility]  CARDIOLOGY [address]  Scheduled Appointment: 04/05/2023

## 2023-01-21 ENCOUNTER — TRANSCRIPTION ENCOUNTER (OUTPATIENT)
Age: 68
End: 2023-01-21

## 2023-01-21 VITALS
OXYGEN SATURATION: 98 % | HEART RATE: 83 BPM | RESPIRATION RATE: 18 BRPM | SYSTOLIC BLOOD PRESSURE: 110 MMHG | DIASTOLIC BLOOD PRESSURE: 76 MMHG

## 2023-01-21 LAB
A1C WITH ESTIMATED AVERAGE GLUCOSE RESULT: 5.6 % — SIGNIFICANT CHANGE UP (ref 4–5.6)
ANION GAP SERPL CALC-SCNC: 8 MMOL/L — SIGNIFICANT CHANGE UP (ref 5–17)
BUN SERPL-MCNC: 16.1 MG/DL — SIGNIFICANT CHANGE UP (ref 8–20)
CALCIUM SERPL-MCNC: 9.5 MG/DL — SIGNIFICANT CHANGE UP (ref 8.4–10.5)
CHLORIDE SERPL-SCNC: 101 MMOL/L — SIGNIFICANT CHANGE UP (ref 96–108)
CO2 SERPL-SCNC: 28 MMOL/L — SIGNIFICANT CHANGE UP (ref 22–29)
CREAT SERPL-MCNC: 1.06 MG/DL — SIGNIFICANT CHANGE UP (ref 0.5–1.3)
EGFR: 78 ML/MIN/1.73M2 — SIGNIFICANT CHANGE UP
ESTIMATED AVERAGE GLUCOSE: 114 MG/DL — SIGNIFICANT CHANGE UP (ref 68–114)
GLUCOSE SERPL-MCNC: 96 MG/DL — SIGNIFICANT CHANGE UP (ref 70–99)
HCT VFR BLD CALC: 42.2 % — SIGNIFICANT CHANGE UP (ref 39–50)
HGB BLD-MCNC: 14.3 G/DL — SIGNIFICANT CHANGE UP (ref 13–17)
MAGNESIUM SERPL-MCNC: 1.8 MG/DL — SIGNIFICANT CHANGE UP (ref 1.6–2.6)
MCHC RBC-ENTMCNC: 30.7 PG — SIGNIFICANT CHANGE UP (ref 27–34)
MCHC RBC-ENTMCNC: 33.9 GM/DL — SIGNIFICANT CHANGE UP (ref 32–36)
MCV RBC AUTO: 90.6 FL — SIGNIFICANT CHANGE UP (ref 80–100)
PLATELET # BLD AUTO: 101 K/UL — LOW (ref 150–400)
POTASSIUM SERPL-MCNC: 4.3 MMOL/L — SIGNIFICANT CHANGE UP (ref 3.5–5.3)
POTASSIUM SERPL-SCNC: 4.3 MMOL/L — SIGNIFICANT CHANGE UP (ref 3.5–5.3)
RBC # BLD: 4.66 M/UL — SIGNIFICANT CHANGE UP (ref 4.2–5.8)
RBC # FLD: 14.1 % — SIGNIFICANT CHANGE UP (ref 10.3–14.5)
SODIUM SERPL-SCNC: 137 MMOL/L — SIGNIFICANT CHANGE UP (ref 135–145)
WBC # BLD: 7.79 K/UL — SIGNIFICANT CHANGE UP (ref 3.8–10.5)
WBC # FLD AUTO: 7.79 K/UL — SIGNIFICANT CHANGE UP (ref 3.8–10.5)

## 2023-01-21 PROCEDURE — 83036 HEMOGLOBIN GLYCOSYLATED A1C: CPT

## 2023-01-21 PROCEDURE — 80048 BASIC METABOLIC PNL TOTAL CA: CPT

## 2023-01-21 PROCEDURE — 37226: CPT

## 2023-01-21 PROCEDURE — 85027 COMPLETE CBC AUTOMATED: CPT

## 2023-01-21 PROCEDURE — 75716 ARTERY X-RAYS ARMS/LEGS: CPT | Mod: XU

## 2023-01-21 PROCEDURE — 93005 ELECTROCARDIOGRAM TRACING: CPT

## 2023-01-21 PROCEDURE — C1894: CPT

## 2023-01-21 PROCEDURE — 86850 RBC ANTIBODY SCREEN: CPT

## 2023-01-21 PROCEDURE — C1876: CPT

## 2023-01-21 PROCEDURE — 86901 BLOOD TYPING SEROLOGIC RH(D): CPT

## 2023-01-21 PROCEDURE — C2623: CPT

## 2023-01-21 PROCEDURE — C1887: CPT

## 2023-01-21 PROCEDURE — 36415 COLL VENOUS BLD VENIPUNCTURE: CPT

## 2023-01-21 PROCEDURE — 83735 ASSAY OF MAGNESIUM: CPT

## 2023-01-21 PROCEDURE — 86900 BLOOD TYPING SEROLOGIC ABO: CPT

## 2023-01-21 PROCEDURE — 93010 ELECTROCARDIOGRAM REPORT: CPT

## 2023-01-21 PROCEDURE — 85025 COMPLETE CBC W/AUTO DIFF WBC: CPT

## 2023-01-21 PROCEDURE — 80061 LIPID PANEL: CPT

## 2023-01-21 PROCEDURE — C1769: CPT

## 2023-01-21 PROCEDURE — C1725: CPT

## 2023-01-21 PROCEDURE — 80053 COMPREHEN METABOLIC PANEL: CPT

## 2023-01-21 RX ORDER — ASPIRIN/CALCIUM CARB/MAGNESIUM 324 MG
1 TABLET ORAL
Qty: 0 | Refills: 0 | DISCHARGE

## 2023-01-21 RX ORDER — ATORVASTATIN CALCIUM 80 MG/1
1 TABLET, FILM COATED ORAL
Qty: 90 | Refills: 3
Start: 2023-01-21 | End: 2024-01-15

## 2023-01-21 RX ORDER — CLOPIDOGREL BISULFATE 75 MG/1
1 TABLET, FILM COATED ORAL
Qty: 90 | Refills: 3
Start: 2023-01-21 | End: 2024-01-15

## 2023-01-21 RX ORDER — CILOSTAZOL 100 MG/1
1 TABLET ORAL
Qty: 180 | Refills: 3
Start: 2023-01-21 | End: 2024-01-15

## 2023-01-21 RX ORDER — ALBUTEROL 90 UG/1
2 AEROSOL, METERED ORAL
Qty: 0 | Refills: 0 | DISCHARGE
Start: 2023-01-21

## 2023-01-21 RX ADMIN — CILOSTAZOL 100 MILLIGRAM(S): 100 TABLET ORAL at 05:59

## 2023-01-21 NOTE — DISCHARGE NOTE NURSING/CASE MANAGEMENT/SOCIAL WORK - NSDCPEFALRISK_GEN_ALL_CORE
For information on Fall & Injury Prevention, visit: [email or web address] OR  [email or web address] OR  https://www.cdc.gov/steadi/patient.html

## 2023-01-21 NOTE — PROGRESS NOTE ADULT - SUBJECTIVE AND OBJECTIVE BOX
Procedure: .RLE angiography, Balloon angioplasty (5.0 mm X 80 mm X 135 cm [place]), DCB (6.0 mm X 150 mm X 130 cm Lutonix DCB), and stent (6.0 mm X 120 mm X 130 cm Innova SE stent) of the right SFA.    Discharge Diagnosis: PAD  Labs Stable and LFA dressing changed and site dry and intact, no hematoma no bleeding    1. Discharge home today    2. Follow up as an outpatient with: Dr. Spencer/Miriam    3. Post procedure teaching done including importance of medication adherence and access site care and monitoring.    4. DAPT: ASA and Plavix (Rx sent)    5. Statin: Lipitor 40 mg qhs (Rx sent)    6. PAD: cilostazol

## 2023-01-21 NOTE — DISCHARGE NOTE NURSING/CASE MANAGEMENT/SOCIAL WORK - PATIENT PORTAL LINK FT
You can access the FollowMyHealth Patient Portal offered by [facility] by registering at the following website: [email or web address]. By joining [employer]’s FollowMyHealth portal, you will also be able to view your health information using other applications (apps) compatible with our system.

## 2023-01-24 PROBLEM — E78.2 MIXED HYPERLIPIDEMIA: Chronic | Status: ACTIVE | Noted: 2023-01-20

## 2023-01-24 PROBLEM — D69.6 THROMBOCYTOPENIA, UNSPECIFIED: Chronic | Status: ACTIVE | Noted: 2023-01-20

## 2023-02-06 ENCOUNTER — APPOINTMENT (OUTPATIENT)
Dept: CARDIOLOGY | Facility: CLINIC | Age: 68
End: 2023-02-06
Payer: MEDICARE

## 2023-02-06 ENCOUNTER — NON-APPOINTMENT (OUTPATIENT)
Age: 68
End: 2023-02-06

## 2023-02-06 VITALS — SYSTOLIC BLOOD PRESSURE: 104 MMHG | DIASTOLIC BLOOD PRESSURE: 60 MMHG

## 2023-02-06 VITALS
WEIGHT: 134.13 LBS | DIASTOLIC BLOOD PRESSURE: 50 MMHG | TEMPERATURE: 99.1 F | HEIGHT: 72 IN | BODY MASS INDEX: 18.17 KG/M2 | HEART RATE: 126 BPM | SYSTOLIC BLOOD PRESSURE: 96 MMHG | OXYGEN SATURATION: 97 %

## 2023-02-06 PROCEDURE — 99215 OFFICE O/P EST HI 40 MIN: CPT

## 2023-02-06 PROCEDURE — 93000 ELECTROCARDIOGRAM COMPLETE: CPT

## 2023-02-06 RX ORDER — DEXAMETHASONE 4 MG/1
4 TABLET ORAL
Qty: 40 | Refills: 0 | Status: DISCONTINUED | COMMUNITY
Start: 2022-12-23 | End: 2023-02-06

## 2023-02-06 RX ORDER — NAPROXEN 250 MG/1
250 TABLET ORAL TWICE DAILY
Refills: 0 | Status: ACTIVE | COMMUNITY

## 2023-02-06 RX ORDER — PREDNISONE 5 MG/1
5 TABLET ORAL
Qty: 150 | Refills: 0 | Status: DISCONTINUED | COMMUNITY
Start: 2022-12-20 | End: 2023-02-06

## 2023-02-06 RX ORDER — VITAMIN B COMPLEX
CAPSULE ORAL
Refills: 0 | Status: ACTIVE | COMMUNITY

## 2023-03-16 ENCOUNTER — OUTPATIENT (OUTPATIENT)
Dept: OUTPATIENT SERVICES | Facility: HOSPITAL | Age: 68
LOS: 1 days | Discharge: HOME | End: 2023-03-16

## 2023-03-16 DIAGNOSIS — D69.6 THROMBOCYTOPENIA, UNSPECIFIED: ICD-10-CM

## 2023-03-16 DIAGNOSIS — Z98.890 OTHER SPECIFIED POSTPROCEDURAL STATES: Chronic | ICD-10-CM

## 2023-03-20 ENCOUNTER — RESULT REVIEW (OUTPATIENT)
Age: 68
End: 2023-03-20

## 2023-03-20 ENCOUNTER — APPOINTMENT (OUTPATIENT)
Dept: HEMATOLOGY ONCOLOGY | Facility: CLINIC | Age: 68
End: 2023-03-20
Payer: MEDICARE

## 2023-03-20 VITALS
DIASTOLIC BLOOD PRESSURE: 71 MMHG | OXYGEN SATURATION: 98 % | WEIGHT: 140.04 LBS | HEART RATE: 110 BPM | BODY MASS INDEX: 18.97 KG/M2 | SYSTOLIC BLOOD PRESSURE: 102 MMHG | HEIGHT: 72 IN

## 2023-03-20 LAB
BASOPHILS # BLD AUTO: 0 K/UL — SIGNIFICANT CHANGE UP (ref 0–0.2)
BASOPHILS NFR BLD AUTO: 0.7 % — SIGNIFICANT CHANGE UP (ref 0–2)
EOSINOPHIL # BLD AUTO: 0.1 K/UL — SIGNIFICANT CHANGE UP (ref 0–0.5)
EOSINOPHIL NFR BLD AUTO: 1.4 % — SIGNIFICANT CHANGE UP (ref 0–6)
HCT VFR BLD CALC: 39.4 % — SIGNIFICANT CHANGE UP (ref 39–50)
HGB BLD-MCNC: 13.8 G/DL — SIGNIFICANT CHANGE UP (ref 13–17)
LYMPHOCYTES # BLD AUTO: 1.6 K/UL — SIGNIFICANT CHANGE UP (ref 1–3.3)
LYMPHOCYTES # BLD AUTO: 26.9 % — SIGNIFICANT CHANGE UP (ref 13–44)
MCHC RBC-ENTMCNC: 31.1 PG — SIGNIFICANT CHANGE UP (ref 27–34)
MCHC RBC-ENTMCNC: 35 G/DL — SIGNIFICANT CHANGE UP (ref 32–36)
MCV RBC AUTO: 88.9 FL — SIGNIFICANT CHANGE UP (ref 80–100)
MONOCYTES # BLD AUTO: 0.5 K/UL — SIGNIFICANT CHANGE UP (ref 0–0.9)
MONOCYTES NFR BLD AUTO: 7.8 % — SIGNIFICANT CHANGE UP (ref 2–14)
NEUTROPHILS # BLD AUTO: 3.7 K/UL — SIGNIFICANT CHANGE UP (ref 1.8–7.4)
NEUTROPHILS NFR BLD AUTO: 63.1 % — SIGNIFICANT CHANGE UP (ref 43–77)
PLATELET # BLD AUTO: 99 K/UL — LOW (ref 150–400)
RBC # BLD: 4.44 M/UL — SIGNIFICANT CHANGE UP (ref 4.2–5.8)
RBC # FLD: 12.9 % — SIGNIFICANT CHANGE UP (ref 10.3–14.5)
WBC # BLD: 5.8 K/UL — SIGNIFICANT CHANGE UP (ref 3.8–10.5)
WBC # FLD AUTO: 5.8 K/UL — SIGNIFICANT CHANGE UP (ref 3.8–10.5)

## 2023-03-20 PROCEDURE — 99214 OFFICE O/P EST MOD 30 MIN: CPT

## 2023-03-20 NOTE — ASSESSMENT
[FreeTextEntry1] : 66 M here for evaluation of thrombocytopenia. Labs on 6/2021 showed low plt, increased liver enzyme.  Pt reports that his plts were low at least for the past 5 years ago. 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 Will have patient come in approximately 5 days prior to procedure for repeat CBC and then initiation of steroids prior if patient is thrombocytopenic. \par \par # Smoking Cessation\par -again counseled pt on reducing smoking \par -advised that his PAD was caused by smoking\par \par PCP Erwin Napier [phone]

## 2023-03-20 NOTE — HISTORY OF PRESENT ILLNESS
[de-identified] : 66 M here for evaluation of thrombocytopenia. Labs on 6/2021 showed low plt, increased liver enzyme.  Pt reports that his plts were low at least for the past 5 years ago. Never require treatment.\par 3/9/2021: plt 98, normal wbc, hgb,\par 5/6/2020; plt 130\par Pt is a current everyday smoker, not ready to quit. Denies any hematologic disorder in the family. No easy bruising. Denies HA. CP, SOB, abd pain, constipation, diarrhea, melena, hematuria, dysuria. \par \par \par PCP Erwin Napier [phone] [de-identified] : 11/15/21: pt's plt improved to 123 today from 70  4 weeks ago. Pt feels well. Denies HA. CP, SOB, abd pain, constipation, diarrhea, melena, hematuria, dysuria. \par \par 5/9/22: pt is here due to fall in plt to 66. He has had low plt for at least past 5 years. Likely ITP. No treatment required so far. No easy bruising. Denies HA. CP, SOB, abd pain, constipation, diarrhea, melena, hematuria, dysuria. \par \par 6/9/22: Felice is here for fu for thrombocytopenia. Plt improves to 74 from 46.  No easy bruising. Denies HA. CP, SOB, abd pain, constipation, diarrhea, melena, hematuria, dysuria. \par \par 9/14/22:  Patient presents for follow up regarding thrombocytopenia \par + Intermittent dizziness. + R ankle pain.  Denies any easy bruising. No petechiae. \par \par 12/20/22:  Patient presents for follow up regarding thrombocytopenia. PLTs 41 today\par + Intermittent R LE pain.  Denies any easy bruising. No petechiae. No recent fevers, chills. \par \par 3/20/23: Patient needing to undergo endoscopy. Here for presurgical evaluation. No bleeding, rash.

## 2023-03-20 NOTE — ASSESSMENT
[FreeTextEntry1] : 66 M here for evaluation of thrombocytopenia. Labs on 6/2021 showed low plt, increased liver enzyme.  Pt reports that his plts were low at least for the past 5 years ago. 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 Will have patient come in approximately 5 days prior to procedure for repeat CBC and then initiation of steroids prior if patient is thrombocytopenic. \par \par # Smoking Cessation\par -again counseled pt on reducing smoking \par -advised that his PAD was caused by smoking\par \par PCP Erwin Napier [phone]

## 2023-03-20 NOTE — HISTORY OF PRESENT ILLNESS
[de-identified] : 66 M here for evaluation of thrombocytopenia. Labs on 6/2021 showed low plt, increased liver enzyme.  Pt reports that his plts were low at least for the past 5 years ago. Never require treatment.\par 3/9/2021: plt 98, normal wbc, hgb,\par 5/6/2020; plt 130\par Pt is a current everyday smoker, not ready to quit. Denies any hematologic disorder in the family. No easy bruising. Denies HA. CP, SOB, abd pain, constipation, diarrhea, melena, hematuria, dysuria. \par \par \par PCP Erwin Napier [phone] [de-identified] : 11/15/21: pt's plt improved to 123 today from 70  4 weeks ago. Pt feels well. Denies HA. CP, SOB, abd pain, constipation, diarrhea, melena, hematuria, dysuria. \par \par 5/9/22: pt is here due to fall in plt to 66. He has had low plt for at least past 5 years. Likely ITP. No treatment required so far. No easy bruising. Denies HA. CP, SOB, abd pain, constipation, diarrhea, melena, hematuria, dysuria. \par \par 6/9/22: Felice is here for fu for thrombocytopenia. Plt improves to 74 from 46.  No easy bruising. Denies HA. CP, SOB, abd pain, constipation, diarrhea, melena, hematuria, dysuria. \par \par 9/14/22:  Patient presents for follow up regarding thrombocytopenia \par + Intermittent dizziness. + R ankle pain.  Denies any easy bruising. No petechiae. \par \par 12/20/22:  Patient presents for follow up regarding thrombocytopenia. PLTs 41 today\par + Intermittent R LE pain.  Denies any easy bruising. No petechiae. No recent fevers, chills. \par \par 3/20/23: Patient needing to undergo endoscopy. Here for presurgical evaluation. No bleeding, rash.

## 2023-04-04 ENCOUNTER — NON-APPOINTMENT (OUTPATIENT)
Age: 68
End: 2023-04-04

## 2023-04-05 ENCOUNTER — APPOINTMENT (OUTPATIENT)
Dept: CARDIOLOGY | Facility: CLINIC | Age: 68
End: 2023-04-05
Payer: MEDICARE

## 2023-04-05 ENCOUNTER — NON-APPOINTMENT (OUTPATIENT)
Age: 68
End: 2023-04-05

## 2023-04-05 VITALS
DIASTOLIC BLOOD PRESSURE: 96 MMHG | TEMPERATURE: 98.1 F | HEART RATE: 116 BPM | OXYGEN SATURATION: 96 % | SYSTOLIC BLOOD PRESSURE: 145 MMHG

## 2023-04-05 PROCEDURE — 99215 OFFICE O/P EST HI 40 MIN: CPT | Mod: 25

## 2023-04-05 PROCEDURE — 93000 ELECTROCARDIOGRAM COMPLETE: CPT

## 2023-04-13 ENCOUNTER — APPOINTMENT (OUTPATIENT)
Dept: HEMATOLOGY ONCOLOGY | Facility: CLINIC | Age: 68
End: 2023-04-13
Payer: MEDICARE

## 2023-04-13 ENCOUNTER — RESULT REVIEW (OUTPATIENT)
Age: 68
End: 2023-04-13

## 2023-04-13 VITALS
WEIGHT: 137 LBS | DIASTOLIC BLOOD PRESSURE: 98 MMHG | HEIGHT: 72 IN | OXYGEN SATURATION: 96 % | SYSTOLIC BLOOD PRESSURE: 130 MMHG | HEART RATE: 119 BPM | TEMPERATURE: 97.4 F | BODY MASS INDEX: 18.56 KG/M2

## 2023-04-13 LAB
BASOPHILS # BLD AUTO: 0.1 K/UL — SIGNIFICANT CHANGE UP (ref 0–0.2)
BASOPHILS NFR BLD AUTO: 0.8 % — SIGNIFICANT CHANGE UP (ref 0–2)
EOSINOPHIL # BLD AUTO: 0.1 K/UL — SIGNIFICANT CHANGE UP (ref 0–0.5)
EOSINOPHIL NFR BLD AUTO: 1.5 % — SIGNIFICANT CHANGE UP (ref 0–6)
HCT VFR BLD CALC: 43.5 % — SIGNIFICANT CHANGE UP (ref 39–50)
HGB BLD-MCNC: 14.4 G/DL — SIGNIFICANT CHANGE UP (ref 13–17)
LYMPHOCYTES # BLD AUTO: 1.8 K/UL — SIGNIFICANT CHANGE UP (ref 1–3.3)
LYMPHOCYTES # BLD AUTO: 30.1 % — SIGNIFICANT CHANGE UP (ref 13–44)
MCHC RBC-ENTMCNC: 30.9 PG — SIGNIFICANT CHANGE UP (ref 27–34)
MCHC RBC-ENTMCNC: 33 G/DL — SIGNIFICANT CHANGE UP (ref 32–36)
MCV RBC AUTO: 93.5 FL — SIGNIFICANT CHANGE UP (ref 80–100)
MONOCYTES # BLD AUTO: 0.5 K/UL — SIGNIFICANT CHANGE UP (ref 0–0.9)
MONOCYTES NFR BLD AUTO: 8 % — SIGNIFICANT CHANGE UP (ref 2–14)
NEUTROPHILS # BLD AUTO: 3.6 K/UL — SIGNIFICANT CHANGE UP (ref 1.8–7.4)
NEUTROPHILS NFR BLD AUTO: 59.5 % — SIGNIFICANT CHANGE UP (ref 43–77)
PLATELET # BLD AUTO: 100 K/UL — LOW (ref 150–400)
RBC # BLD: 4.65 M/UL — SIGNIFICANT CHANGE UP (ref 4.2–5.8)
RBC # FLD: 13.2 % — SIGNIFICANT CHANGE UP (ref 10.3–14.5)
WBC # BLD: 6.1 K/UL — SIGNIFICANT CHANGE UP (ref 3.8–10.5)
WBC # FLD AUTO: 6.1 K/UL — SIGNIFICANT CHANGE UP (ref 3.8–10.5)

## 2023-04-13 PROCEDURE — 99214 OFFICE O/P EST MOD 30 MIN: CPT

## 2023-05-02 NOTE — HISTORY OF PRESENT ILLNESS
[de-identified] : 66 M here for evaluation of thrombocytopenia. Labs on 6/2021 showed low plt, increased liver enzyme.  Pt reports that his plts were low at least for the past 5 years ago. Never require treatment.\par 3/9/2021: plt 98, normal wbc, hgb,\par 5/6/2020; plt 130\par Pt is a current everyday smoker, not ready to quit. Denies any hematologic disorder in the family. No easy bruising. Denies HA. CP, SOB, abd pain, constipation, diarrhea, melena, hematuria, dysuria. \par \par \par PCP Erwin Napier [phone] [de-identified] : Patient presents for follow up regarding thrombocytopenia. PLTs 100 today\par Intermittent R LE pain much improved s/p LE stent.  Again denies any easy bruising or petechiae. No recent fevers, chills.

## 2023-05-02 NOTE — ASSESSMENT
[FreeTextEntry1] : 66 M here for evaluation of thrombocytopenia. Labs on 6/2021 showed low plt, increased liver enzyme.  Pt reports that his plts were low at least for the past 5 years ago. Never require treatment.\par 3/9/2021: plt 98, normal wbc, hgb,\par 5/6/2020; plt 130\par \par # Thrombocytopenia\par -likely ITP given the chronicity (5 years) and normal WBC, Hgb.\par -negative hepatitis panel\par -PBS reviewed 10/2021: normal RBC with appropriate central pallor, no schistocytes.Granulocytes, lymphocytes appear normal morphologically, no increased in immature cells, decreased platelets, no clumping \par -underlying hematologic cause is unlikely \par -normal iron, B12, folate\par -patient now for reevaluation for endoscopy. Presents today, approximately 5 days prior to procedure for repeat CBC.  PLTs 100.\par -d/w Dr Shanks, patient clear, from a hematologic standpoint, for planned EGD with PLTs 100.  No need for steroids.\par \par # Smoking Cessation\par -again counseled pt on reducing smoking \par -advised that his PAD was caused by smoking\par \par PCP Erwin Napier [phone]

## 2023-06-12 ENCOUNTER — NON-APPOINTMENT (OUTPATIENT)
Age: 68
End: 2023-06-12

## 2023-06-12 NOTE — REVIEW OF SYSTEMS
[Palpitations] : palpitations [Dizziness] : dizziness [Feeling Fatigued] : not feeling fatigued [SOB] : no shortness of breath [Dyspnea on exertion] : not dyspnea during exertion [Chest Discomfort] : no chest discomfort [Lower Ext Edema] : no extremity edema [Leg Claudication] : no intermittent leg claudication [Orthopnea] : no orthopnea [Syncope] : no syncope

## 2023-06-12 NOTE — PHYSICAL EXAM
[Well Developed] : well developed [No Acute Distress] : no acute distress [No Carotid Bruit] : no carotid bruit [Normal S1, S2] : normal S1, S2 [No Murmur] : no murmur [Clear Lung Fields] : clear lung fields [No Respiratory Distress] : no respiratory distress  [No Edema] : no edema [Normal Radial B/L] : normal radial B/L [Normal PT B/L] : normal PT B/L [Moves all extremities] : moves all extremities [Normal Speech] : normal speech [Alert and Oriented] : alert and oriented [Normal memory] : normal memory [Well Nourished] : well nourished [Normal Conjunctiva] : normal conjunctiva [Normal Venous Pressure] : normal venous pressure [No Rub] : no rub [No Gallop] : no gallop [Good Air Entry] : good air entry [Soft] : abdomen soft [Non Tender] : non-tender [No Masses/organomegaly] : no masses/organomegaly [Normal Bowel Sounds] : normal bowel sounds [Normal Gait] : normal gait [No Cyanosis] : no cyanosis [No Clubbing] : no clubbing [No Varicosities] : no varicosities [No Rash] : no rash [No Skin Lesions] : no skin lesions [No Focal Deficits] : no focal deficits [de-identified] : Deferred- wearing mask  [de-identified] : left femoral access site with soft ecchymosis, approximately 1cm by 1.5 cm likely hematoma noted beneath access site, palpable femoral pulse, no edema, erythema or exudate noted.

## 2023-06-12 NOTE — DISCUSSION/SUMMARY
[FreeTextEntry1] : Mr. JACOBO CRAMER is a 67 year male with known PAD and recent stent implantation in the Right SFA. No complications. \par At the present time he is completely asymptomatic, except for difficulty swallowing, particularly solids. Occasionally also fluids. No weight loss. Will undergo an upper endoscopy. This is a low risk patient undergoing a low risk procedure (cardiac risk <1%). Therefore there is no need for further testing prior to the procedure.\par I have recommended to continue the same medications.\par Clopidogrel should be stopped 5 days prior to the procedure.\par Routine follow up in 3 months\par

## 2023-06-12 NOTE — ADDENDUM
[FreeTextEntry1] : Addendum 6/12/2023: Per Dr. Pineda, patient may proceed with dental work tooth extractions as planned. He may hold Plavix for 5-7 days prior to dental work, then resume asap after procedure. \par \par Leisa BRIZUELA\par

## 2023-06-12 NOTE — HISTORY OF PRESENT ILLNESS
[FreeTextEntry1] : 66 y/o male with PMHx of atypical chest pain but has negative stress test and normal EF on echo \par Referred to me because of right LE claudication \par 67 yo man, single, no children, doesn’t work, retired used to work with disabled patients. Since 2017 has been C/O chest pain, intermittent, not effort related, pressure like, mid sternal, sharp, may last hours, does not radiate, not associated with SOB, palpitations or dizziness. Never effort related. The last episode lasted 3 days and was not associated with SOB, palpitations or dizziness. Denies SOB, orthopnea or PND. Denies palpitations or ankle swelling.\par On 11/3/2020 he presented to [facility] with chest pain. Underwent tests, cardiac troponins and ECG were WNL and was D/Mario Alberto home. \par Since that visit he has been asymptomatic and at the present time he denies chest pain or SOB. \par Underwent nuclear stress test in 3/4/2021 that revealed normal perfusion and normal EF.\par Echocardiogram performed 1/14/2021 was also WNL. \par C/O Right Foot pain when he walks < 1/2 block, pain will go away when he stops but sometimes he walks through the pain and it gets better. He sometimes c/o of the same pain at rest no ulcers, no non healing wounds, no numbness/tingling. KEKE showed moderate ischemia in RLE. s/p RLE angiography 1/20/2023 revealing right SFA , Balloon angioplasty (5.0 mm X 80 mm X 135 cm [place]), DCB (6.0 mm X 150 mm X 130 cm Lutonix DCB), and stent (6.0 mm X 120 mm X 130 cm Innova SE stent) of the right SFA. \par Has been C/O difficulty swallowing since 6/2022, specially for solids, occasionally also for fluids. No weight loss. Will undergo upper endoscopy on 4/18/2023. \par Has been under follow up with Hem/Onc for thrombocytopenia and elevated LFTs. Most likely ITP. Under follow up only. No treatment at the present time. \par Hyperlipidemia not treated\par COPD treated with meds\par Underwent Rt knee surgery in 2014\par Smokes 5 cigs/day, smoked a pack/day until 2017\par Doesnt drink alcohol\par Denies the use of illicit drugs\par No family history of heart disease\par Received the COVID 19 vaccine and boosters. \par

## 2023-07-05 ENCOUNTER — NON-APPOINTMENT (OUTPATIENT)
Age: 68
End: 2023-07-05

## 2023-07-05 ENCOUNTER — APPOINTMENT (OUTPATIENT)
Dept: CARDIOLOGY | Facility: CLINIC | Age: 68
End: 2023-07-05
Payer: MEDICARE

## 2023-07-05 VITALS
RESPIRATION RATE: 16 BRPM | BODY MASS INDEX: 17.88 KG/M2 | HEIGHT: 72 IN | OXYGEN SATURATION: 98 % | HEART RATE: 90 BPM | SYSTOLIC BLOOD PRESSURE: 118 MMHG | WEIGHT: 132 LBS | DIASTOLIC BLOOD PRESSURE: 74 MMHG | TEMPERATURE: 98 F

## 2023-07-05 PROCEDURE — 93242 EXT ECG>48HR<7D RECORDING: CPT

## 2023-07-05 PROCEDURE — 93000 ELECTROCARDIOGRAM COMPLETE: CPT | Mod: 59

## 2023-07-05 PROCEDURE — 99214 OFFICE O/P EST MOD 30 MIN: CPT | Mod: 25

## 2023-07-05 RX ORDER — IPRATROPIUM BROMIDE AND ALBUTEROL 20; 100 UG/1; UG/1
20-100 SPRAY, METERED RESPIRATORY (INHALATION)
Refills: 0 | Status: ACTIVE | COMMUNITY

## 2023-07-12 ENCOUNTER — NON-APPOINTMENT (OUTPATIENT)
Age: 68
End: 2023-07-12

## 2023-07-19 ENCOUNTER — EMERGENCY (EMERGENCY)
Facility: HOSPITAL | Age: 68
LOS: 1 days | Discharge: HOME | End: 2023-07-19
Attending: STUDENT IN AN ORGANIZED HEALTH CARE EDUCATION/TRAINING PROGRAM
Payer: MEDICARE

## 2023-07-19 VITALS
DIASTOLIC BLOOD PRESSURE: 75 MMHG | SYSTOLIC BLOOD PRESSURE: 115 MMHG | TEMPERATURE: 98 F | HEART RATE: 80 BPM | OXYGEN SATURATION: 99 % | RESPIRATION RATE: 17 BRPM

## 2023-07-19 VITALS
RESPIRATION RATE: 17 BRPM | HEART RATE: 99 BPM | TEMPERATURE: 98 F | OXYGEN SATURATION: 97 % | WEIGHT: 134.92 LBS | SYSTOLIC BLOOD PRESSURE: 128 MMHG | DIASTOLIC BLOOD PRESSURE: 79 MMHG

## 2023-07-19 DIAGNOSIS — Z98.890 OTHER SPECIFIED POSTPROCEDURAL STATES: Chronic | ICD-10-CM

## 2023-07-19 LAB
ALBUMIN SERPL ELPH-MCNC: 4.4 G/DL — SIGNIFICANT CHANGE UP (ref 3.3–5.2)
ALP SERPL-CCNC: 95 U/L — SIGNIFICANT CHANGE UP (ref 40–120)
ALT FLD-CCNC: 73 U/L — HIGH
ANION GAP SERPL CALC-SCNC: 13 MMOL/L — SIGNIFICANT CHANGE UP (ref 5–17)
AST SERPL-CCNC: 41 U/L — HIGH
BASOPHILS # BLD AUTO: 0.04 K/UL — SIGNIFICANT CHANGE UP (ref 0–0.2)
BASOPHILS NFR BLD AUTO: 0.4 % — SIGNIFICANT CHANGE UP (ref 0–2)
BILIRUB SERPL-MCNC: 0.5 MG/DL — SIGNIFICANT CHANGE UP (ref 0.4–2)
BUN SERPL-MCNC: 15.2 MG/DL — SIGNIFICANT CHANGE UP (ref 8–20)
CALCIUM SERPL-MCNC: 9.5 MG/DL — SIGNIFICANT CHANGE UP (ref 8.4–10.5)
CHLORIDE SERPL-SCNC: 106 MMOL/L — SIGNIFICANT CHANGE UP (ref 96–108)
CO2 SERPL-SCNC: 23 MMOL/L — SIGNIFICANT CHANGE UP (ref 22–29)
CREAT SERPL-MCNC: 1.12 MG/DL — SIGNIFICANT CHANGE UP (ref 0.5–1.3)
EGFR: 73 ML/MIN/1.73M2 — SIGNIFICANT CHANGE UP
EOSINOPHIL # BLD AUTO: 0.04 K/UL — SIGNIFICANT CHANGE UP (ref 0–0.5)
EOSINOPHIL NFR BLD AUTO: 0.4 % — SIGNIFICANT CHANGE UP (ref 0–6)
GLUCOSE SERPL-MCNC: 119 MG/DL — HIGH (ref 70–99)
HCT VFR BLD CALC: 38.5 % — LOW (ref 39–50)
HGB BLD-MCNC: 12.9 G/DL — LOW (ref 13–17)
IMM GRANULOCYTES NFR BLD AUTO: 0.3 % — SIGNIFICANT CHANGE UP (ref 0–0.9)
LYMPHOCYTES # BLD AUTO: 1.61 K/UL — SIGNIFICANT CHANGE UP (ref 1–3.3)
LYMPHOCYTES # BLD AUTO: 16.7 % — SIGNIFICANT CHANGE UP (ref 13–44)
MCHC RBC-ENTMCNC: 30.6 PG — SIGNIFICANT CHANGE UP (ref 27–34)
MCHC RBC-ENTMCNC: 33.5 GM/DL — SIGNIFICANT CHANGE UP (ref 32–36)
MCV RBC AUTO: 91.2 FL — SIGNIFICANT CHANGE UP (ref 80–100)
MONOCYTES # BLD AUTO: 0.48 K/UL — SIGNIFICANT CHANGE UP (ref 0–0.9)
MONOCYTES NFR BLD AUTO: 5 % — SIGNIFICANT CHANGE UP (ref 2–14)
NEUTROPHILS # BLD AUTO: 7.42 K/UL — HIGH (ref 1.8–7.4)
NEUTROPHILS NFR BLD AUTO: 77.2 % — HIGH (ref 43–77)
PLATELET # BLD AUTO: 74 K/UL — LOW (ref 150–400)
POTASSIUM SERPL-MCNC: 3.6 MMOL/L — SIGNIFICANT CHANGE UP (ref 3.5–5.3)
POTASSIUM SERPL-SCNC: 3.6 MMOL/L — SIGNIFICANT CHANGE UP (ref 3.5–5.3)
PROT SERPL-MCNC: 6.7 G/DL — SIGNIFICANT CHANGE UP (ref 6.6–8.7)
RBC # BLD: 4.22 M/UL — SIGNIFICANT CHANGE UP (ref 4.2–5.8)
RBC # FLD: 15.9 % — HIGH (ref 10.3–14.5)
SODIUM SERPL-SCNC: 142 MMOL/L — SIGNIFICANT CHANGE UP (ref 135–145)
WBC # BLD: 9.62 K/UL — SIGNIFICANT CHANGE UP (ref 3.8–10.5)
WBC # FLD AUTO: 9.62 K/UL — SIGNIFICANT CHANGE UP (ref 3.8–10.5)

## 2023-07-19 PROCEDURE — 83690 ASSAY OF LIPASE: CPT

## 2023-07-19 PROCEDURE — 80053 COMPREHEN METABOLIC PANEL: CPT

## 2023-07-19 PROCEDURE — 93005 ELECTROCARDIOGRAM TRACING: CPT

## 2023-07-19 PROCEDURE — 93010 ELECTROCARDIOGRAM REPORT: CPT

## 2023-07-19 PROCEDURE — 96374 THER/PROPH/DIAG INJ IV PUSH: CPT

## 2023-07-19 PROCEDURE — 36415 COLL VENOUS BLD VENIPUNCTURE: CPT

## 2023-07-19 PROCEDURE — 71046 X-RAY EXAM CHEST 2 VIEWS: CPT

## 2023-07-19 PROCEDURE — 99285 EMERGENCY DEPT VISIT HI MDM: CPT | Mod: 25

## 2023-07-19 PROCEDURE — 99284 EMERGENCY DEPT VISIT MOD MDM: CPT

## 2023-07-19 PROCEDURE — 85025 COMPLETE CBC W/AUTO DIFF WBC: CPT

## 2023-07-19 PROCEDURE — 71046 X-RAY EXAM CHEST 2 VIEWS: CPT | Mod: 26

## 2023-07-19 RX ORDER — FAMOTIDINE 10 MG/ML
20 INJECTION INTRAVENOUS ONCE
Refills: 0 | Status: COMPLETED | OUTPATIENT
Start: 2023-07-19 | End: 2023-07-19

## 2023-07-19 RX ADMIN — FAMOTIDINE 20 MILLIGRAM(S): 10 INJECTION INTRAVENOUS at 15:35

## 2023-07-19 RX ADMIN — Medication 30 MILLILITER(S): at 15:35

## 2023-07-19 NOTE — ED PROVIDER NOTE - OBJECTIVE STATEMENT
66 yo M with PMHx of COPD, GERD, dysphagia s/p multiple swallow studies, and PVD (with right leg stent placement and on plavix, cilostazol) presents with constant gastric pain since 0800 this AM that has now resolved for an hour, associated with bloating and an episode of dizziness, blacking out and feeling hot on getting up from the toilet after defecating. Denies LOC, fall, trauma. Last BM this AM, describes as normal. Denies blood in stool, N/V, fever, chills, diarrhea, constipation, CP, SOB, cough.

## 2023-07-19 NOTE — ED PROVIDER NOTE - ATTENDING CONTRIBUTION TO CARE
pt with epigastric pain relieved by pepto and also today after defacating episode of syncope few seconds, denies head trauma. pt with no pain here   Const: Awake, alert and oriented. In no acute distress. Well appearing.  HEENT: NC/AT. Moist mucous membranes.  Eyes: No scleral icterus. EOMI.  Neck:. Soft and supple. Full ROM without pain.  Cardiac: Regular rate and regular rhythm. +S1/S2. Peripheral pulses 2+ and symmetric. No LE edema.  Resp: Speaking in full sentences. No evidence of respiratory distress. No wheezes, rales or rhonchi.  Abd: Soft, non-tender, non-distended. Normal bowel sounds in all 4 quadrants. No guarding or rebound.  Back: Spine midline and non-tender. No CVAT.  Skin: No rashes, abrasions or lacerations.  Lymph: No cervical lymphadenopathy.  Neuro: A&Ox3, moving all extremities symmetrically, follows commands, motor praveen upper and lower ext 5/5, sensory symm and intact CN 2-12 grossly intact, no ataxia, no nystagmus, no dysmetria, no ddk, symm praveen, no pronator drift  pt with no obvious arrhythmia on ekg such as wpw/burgada/avrt/afib/aflutter/svt/heart block/longqt no acute ischemic findings, pt likley with vagal response, well appearing here stable for dc with follow up

## 2023-07-19 NOTE — ED ADULT NURSE NOTE - OBJECTIVE STATEMENT
Pt is a/ox3 c/o general abdominal pain that is worse toward the lower mid abdomen. Pt states the pain is intermittent and started around 8AM today. Pt states he ate 3 cookies this AM before taking his cholesterol medication and started to feel the pain. Pt states the pain is lessened when he shakes/rubs his abdomen with his hand. Pt states he felt lightheaded along with the stomach pain. Pt denies SOB, chest pain, visual changes, N/V/D. Pt states he had a regular bowel movement this AM but has not been able to pass gas. Pt denies pain radiating elsewhere. Pt is a/ox3 c/o general abdominal pain that is worse toward the lower mid abdomen. Pt states the pain is intermittent and started around 8AM today. Pt states he ate 3 cookies this AM before taking his cholesterol medication and started to feel the pain. Pt states the pain is lessened when he shakes/rubs his abdomen with his hand. Pt states he felt lightheaded along with the stomach pain. Pt denies SOB, chest pain, visual changes, N/V/D. Pt states he had a regular bowel movement this AM but has not been able to pass gas. Pt denies pain radiating elsewhere. Pt states he took Pepto-Bismol this morning.

## 2023-07-19 NOTE — ED PROVIDER NOTE - NS ED ROS FT
General: No fever, no massive bleeding  Head: No HA, no ongoing scalp bleed  ENT: no neck pain, no sore throat  Resp: No SOB, no hemoptysis  Cardiovascular: No CP, No LOC  GI: +abdominal pain, No blood in stool  : No dysuria, no hematuria   MSK: No back pain, no limb pain  Skin: No painful or bleeding lesions  Neuro: No paresthesias, No focal deficits

## 2023-07-19 NOTE — ED PROVIDER NOTE - PATIENT PORTAL LINK FT
You can access the FollowMyHealth Patient Portal offered by [facility] by registering at the following website: [email or web address]. By joining [facility]’s FollowMyHealth portal, you will also be able to view your health information using other applications (apps) compatible with our system.

## 2023-07-19 NOTE — ED ADULT TRIAGE NOTE - CHIEF COMPLAINT QUOTE
biba home c/o generalized mid abd pain, dizziness while having BM 1 hr PTA. + bloating + nausea . denies sob/cp

## 2023-07-19 NOTE — ED ADULT NURSE NOTE - TEMPLATE LIST FOR HEAD TO TOE ASSESSMENT
Mother call requesting refill on Miralax upcoming well child exam 03/08/21 last fill on medication  01/15/2020 with 5 refills medication pending. Please advise, thank you. Abdominal Pain, N/V/D

## 2023-07-19 NOTE — ED PROVIDER NOTE - PHYSICAL EXAMINATION
General: NAD, well appearing  HEENT: Normocephalic, atraumatic  Neck: No apparent stiffness or JVD  Pulm: Chest wall symmetric and nontender, lungs clear to ascultation   Cardiac: Regular rate and regular rhythm  Abdomen: Nontender and ++mild distension over epigastric region  Skin: Skin is warm, dry and intact without rashes or lesions.  Neuro: No motor or sensory deficits, CN 2-12 intact, PERRLA, EOMI, moving extremities equally, no dysmetria, no ataxia   MSK: No deformity or tenderness

## 2023-07-19 NOTE — ED PROVIDER NOTE - CLINICAL SUMMARY MEDICAL DECISION MAKING FREE TEXT BOX
68 yo M with PMHx of COPD, GERD and PVD (with right leg stent placement and on plavix, cilostazol) presents with constant gastric pain since 0800 this AM that has now resolved for an hour, associated with bloating and an episode of dizziness, blacking out and feeling hot on getting up from the toilet after defecating.     CBC/CMP to evaluate for blood loss/electrolyte abnormalities, lipase to evaluate for pancreatitis, CXR to evaluate for perf. Pepcid/maalox for discomfort. 68 yo M with PMHx of COPD, GERD and PVD (with right leg stent placement and on plavix, cilostazol) presents with constant gastric pain since 0800 this AM that has now resolved for an hour, associated with bloating and an episode of dizziness, blacking out and feeling hot on getting up from the toilet after defecating.     CBC/CMP to evaluate for blood loss/electrolyte abnormalities, lipase to evaluate for pancreatitis, CXR to evaluate for perf. Pepcid/maalox for discomfort.    Workup is unremarkable for any emergent process.   Patient is now feeling improved and wishes to leave.  Patient / family members have capacity.    Patient/family was given full return precautions, counseled on red flag symptoms such as LOC, fever, severe pain, or focal deficits and advised to return to the ED for these reasons or any reason that was concerning to them. Patient/family was informed of all significant and incidental findings found on this workup today and all results were reviewed.   All questions were answered, advised to make close follow up with their primary care provider and specialty clinics (as applicable) to follow up with this visit and continue investigation/treatment.   Patient/family has shown adequate understanding and is agreeable to the plan.

## 2023-07-19 NOTE — ED ADULT NURSE REASSESSMENT NOTE - NS ED NURSE REASSESS COMMENT FT1
Pt ambulated to bathroom with cane due to urgency of bowel movement. Pt states he had 3 episodes of diarrhea in the bathroom that was brown in color and had no blood present. Pt states it was first soft and then watery. Pt states relief after bowel movement but still has pain and discomfort in abdomen. Stretcher is in lowest position, call bell is within reach.

## 2023-07-19 NOTE — ED PROVIDER NOTE - NSICDXPASTMEDICALHX_GEN_ALL_CORE_FT
PAST MEDICAL HISTORY:  Back pain     COPD (chronic obstructive pulmonary disease)     Mixed hyperlipidemia     Thrombocytopenia

## 2023-07-27 ENCOUNTER — NON-APPOINTMENT (OUTPATIENT)
Age: 68
End: 2023-07-27

## 2023-08-08 ENCOUNTER — OFFICE (OUTPATIENT)
Dept: URBAN - METROPOLITAN AREA CLINIC 115 | Facility: CLINIC | Age: 68
Setting detail: OPHTHALMOLOGY
End: 2023-08-08
Payer: COMMERCIAL

## 2023-08-08 DIAGNOSIS — H01.001: ICD-10-CM

## 2023-08-08 DIAGNOSIS — H25.13: ICD-10-CM

## 2023-08-08 DIAGNOSIS — H01.004: ICD-10-CM

## 2023-08-08 DIAGNOSIS — H40.003: ICD-10-CM

## 2023-08-08 PROCEDURE — 92014 COMPRE OPH EXAM EST PT 1/>: CPT | Performed by: OPHTHALMOLOGY

## 2023-08-08 PROCEDURE — 92133 CPTRZD OPH DX IMG PST SGM ON: CPT | Performed by: OPHTHALMOLOGY

## 2023-08-08 ASSESSMENT — TONOMETRY
OD_IOP_MMHG: 15
OS_IOP_MMHG: 12

## 2023-08-08 ASSESSMENT — REFRACTION_MANIFEST
OD_ADD: +2.50
OS_SPHERE: -0.50
OD_AXIS: 090
OU_VA: 20/20
OS_VA1: 20/25
OD_CYLINDER: -0.75
OS_CYLINDER: -0.75
OS_AXIS: 110
OD_SPHERE: PL
OD_VA1: 20/20
OS_ADD: +2.50

## 2023-08-08 ASSESSMENT — LID EXAM ASSESSMENTS
OD_BLEPHARITIS: RUL T
OS_BLEPHARITIS: LUL T

## 2023-08-08 ASSESSMENT — SPHEQUIV_DERIVED
OS_SPHEQUIV: -1.375
OD_SPHEQUIV: -1
OS_SPHEQUIV: -0.875

## 2023-08-08 ASSESSMENT — CONFRONTATIONAL VISUAL FIELD TEST (CVF)
OD_FINDINGS: FULL
OS_FINDINGS: FULL

## 2023-08-08 ASSESSMENT — PACHYMETRY
OD_CT_CORRECTION: -1
OS_CT_CORRECTION: -1
OS_CT_UM: 562
OD_CT_UM: 564

## 2023-08-08 ASSESSMENT — REFRACTION_AUTOREFRACTION
OD_AXIS: 087
OD_SPHERE: -0.50
OS_CYLINDER: -1.25
OS_SPHERE: -0.75
OS_AXIS: 108
OD_CYLINDER: -1.00

## 2023-08-08 ASSESSMENT — VISUAL ACUITY
OD_BCVA: 20/40
OS_BCVA: 20/25

## 2023-09-04 ENCOUNTER — OUTPATIENT (OUTPATIENT)
Dept: OUTPATIENT SERVICES | Facility: HOSPITAL | Age: 68
LOS: 1 days | Discharge: HOME | End: 2023-09-04

## 2023-09-04 DIAGNOSIS — D69.6 THROMBOCYTOPENIA, UNSPECIFIED: ICD-10-CM

## 2023-09-04 DIAGNOSIS — Z98.890 OTHER SPECIFIED POSTPROCEDURAL STATES: Chronic | ICD-10-CM

## 2023-09-07 ENCOUNTER — RESULT REVIEW (OUTPATIENT)
Age: 68
End: 2023-09-07

## 2023-09-07 ENCOUNTER — APPOINTMENT (OUTPATIENT)
Dept: HEMATOLOGY ONCOLOGY | Facility: CLINIC | Age: 68
End: 2023-09-07
Payer: MEDICARE

## 2023-09-07 VITALS
BODY MASS INDEX: 17.1 KG/M2 | WEIGHT: 126.21 LBS | HEIGHT: 72 IN | TEMPERATURE: 98.8 F | HEART RATE: 113 BPM | DIASTOLIC BLOOD PRESSURE: 78 MMHG | SYSTOLIC BLOOD PRESSURE: 114 MMHG | OXYGEN SATURATION: 98 %

## 2023-09-07 PROCEDURE — 99214 OFFICE O/P EST MOD 30 MIN: CPT

## 2023-09-07 NOTE — HISTORY OF PRESENT ILLNESS
[de-identified] : 66 M here for evaluation of thrombocytopenia. Labs on 6/2021 showed low plt, increased liver enzyme.  Pt reports that his plts were low at least for the past 5 years ago. Never require treatment.\par  3/9/2021: plt 98, normal wbc, hgb,\par  5/6/2020; plt 130\par  Pt is a current everyday smoker, not ready to quit. Denies any hematologic disorder in the family. No easy bruising. Denies HA. CP, SOB, abd pain, constipation, diarrhea, melena, hematuria, dysuria. \par  \par  \par  PCP Erwin Napier [phone] [de-identified] : Patient presents for follow up regarding thrombocytopenia. PLTs 66 today Intermittent R LE pain much improved s/p LE stent.  Again denies any easy bruising or petechiae. No recent fevers, chills. He reports he will be going for an EGD in the next few months but it has not been scheduled.  Still smokes about 5 cigs a day

## 2023-09-07 NOTE — ASSESSMENT
[FreeTextEntry1] : 66 M here for evaluation of thrombocytopenia. Labs on 6/2021 showed low plt, increased liver enzyme.  Pt reports that his plts were low at least for the past 5 years ago. Never require treatment. 3/9/2021: plt 98, normal wbc, hgb, 5/6/2020; plt 130  # Thrombocytopenia -likely ITP given the chronicity (5 years) and normal WBC, Hgb. -negative hepatitis panel -PBS reviewed 10/2021: normal RBC with appropriate central pallor, no schistocytes.Granulocytes, lymphocytes appear normal morphologically, no increased in immature cells, decreased platelets, no clumping  -underlying hematologic cause is unlikely  -normal iron, B12, folate -patient now for reevaluation for endoscopy which will occur within the next few months. He should return for a blood check in about 6 weeks and will treat with steroids if deemed necessary/appropriate for upcoming procedure. Today his CBC.  PLTs 66. -  # Smoking Cessation -again counseled pt on reducing smoking  -advised that his PAD was caused by smoking  PCP Erwin Napier [phone]

## 2023-09-08 ENCOUNTER — APPOINTMENT (OUTPATIENT)
Dept: CARDIOLOGY | Facility: CLINIC | Age: 68
End: 2023-09-08
Payer: MEDICARE

## 2023-09-08 PROCEDURE — 93306 TTE W/DOPPLER COMPLETE: CPT

## 2023-09-11 ENCOUNTER — NON-APPOINTMENT (OUTPATIENT)
Age: 68
End: 2023-09-11

## 2023-09-27 ENCOUNTER — APPOINTMENT (OUTPATIENT)
Dept: CARDIOLOGY | Facility: CLINIC | Age: 68
End: 2023-09-27
Payer: MEDICARE

## 2023-09-27 VITALS
DIASTOLIC BLOOD PRESSURE: 77 MMHG | HEIGHT: 72 IN | HEART RATE: 72 BPM | WEIGHT: 125 LBS | SYSTOLIC BLOOD PRESSURE: 119 MMHG | TEMPERATURE: 98 F | BODY MASS INDEX: 16.93 KG/M2 | OXYGEN SATURATION: 97 %

## 2023-09-27 DIAGNOSIS — E78.5 HYPERLIPIDEMIA, UNSPECIFIED: ICD-10-CM

## 2023-09-27 PROCEDURE — 99213 OFFICE O/P EST LOW 20 MIN: CPT

## 2023-10-17 ENCOUNTER — RESULT REVIEW (OUTPATIENT)
Age: 68
End: 2023-10-17

## 2023-10-17 ENCOUNTER — APPOINTMENT (OUTPATIENT)
Dept: HEMATOLOGY ONCOLOGY | Facility: CLINIC | Age: 68
End: 2023-10-17
Payer: MEDICARE

## 2023-10-17 VITALS
HEART RATE: 94 BPM | HEIGHT: 72 IN | DIASTOLIC BLOOD PRESSURE: 85 MMHG | SYSTOLIC BLOOD PRESSURE: 126 MMHG | TEMPERATURE: 97.2 F | BODY MASS INDEX: 17.34 KG/M2 | OXYGEN SATURATION: 97 % | WEIGHT: 128 LBS

## 2023-10-17 PROCEDURE — 99214 OFFICE O/P EST MOD 30 MIN: CPT

## 2023-10-18 ENCOUNTER — APPOINTMENT (OUTPATIENT)
Dept: CARDIOLOGY | Facility: CLINIC | Age: 68
End: 2023-10-18

## 2023-10-23 ENCOUNTER — RX RENEWAL (OUTPATIENT)
Age: 68
End: 2023-10-23

## 2023-10-23 RX ORDER — CILOSTAZOL 100 MG/1
100 TABLET ORAL TWICE DAILY
Qty: 180 | Refills: 3 | Status: ACTIVE | COMMUNITY
Start: 1900-01-01 | End: 1900-01-01

## 2023-12-26 ENCOUNTER — RX RENEWAL (OUTPATIENT)
Age: 68
End: 2023-12-26

## 2023-12-26 RX ORDER — CLOPIDOGREL BISULFATE 75 MG/1
75 TABLET, FILM COATED ORAL DAILY
Qty: 90 | Refills: 1 | Status: ACTIVE | COMMUNITY
Start: 2023-12-26 | End: 1900-01-01

## 2024-01-05 ENCOUNTER — OUTPATIENT (OUTPATIENT)
Dept: OUTPATIENT SERVICES | Facility: HOSPITAL | Age: 69
LOS: 1 days | End: 2024-01-05
Payer: MEDICARE

## 2024-01-05 DIAGNOSIS — Z98.890 OTHER SPECIFIED POSTPROCEDURAL STATES: Chronic | ICD-10-CM

## 2024-01-05 DIAGNOSIS — R13.19 OTHER DYSPHAGIA: ICD-10-CM

## 2024-01-05 PROCEDURE — 91038 ESOPH IMPED FUNCT TEST > 1HR: CPT

## 2024-01-05 PROCEDURE — 91010 ESOPHAGUS MOTILITY STUDY: CPT

## 2024-01-17 ENCOUNTER — OUTPATIENT (OUTPATIENT)
Dept: OUTPATIENT SERVICES | Facility: HOSPITAL | Age: 69
LOS: 1 days | Discharge: HOME | End: 2024-01-17

## 2024-01-17 DIAGNOSIS — D69.6 THROMBOCYTOPENIA, UNSPECIFIED: ICD-10-CM

## 2024-01-17 DIAGNOSIS — Z98.890 OTHER SPECIFIED POSTPROCEDURAL STATES: Chronic | ICD-10-CM

## 2024-01-23 ENCOUNTER — RESULT REVIEW (OUTPATIENT)
Age: 69
End: 2024-01-23

## 2024-01-23 ENCOUNTER — APPOINTMENT (OUTPATIENT)
Dept: HEMATOLOGY ONCOLOGY | Facility: CLINIC | Age: 69
End: 2024-01-23
Payer: MEDICARE

## 2024-01-23 VITALS
HEART RATE: 82 BPM | WEIGHT: 127.76 LBS | DIASTOLIC BLOOD PRESSURE: 80 MMHG | SYSTOLIC BLOOD PRESSURE: 120 MMHG | TEMPERATURE: 98.1 F | HEIGHT: 72 IN | OXYGEN SATURATION: 97 % | BODY MASS INDEX: 17.3 KG/M2

## 2024-01-23 LAB
BASOPHILS # BLD AUTO: 0.1 K/UL — SIGNIFICANT CHANGE UP (ref 0–0.2)
BASOPHILS NFR BLD AUTO: 1.5 % — SIGNIFICANT CHANGE UP (ref 0–2)
EOSINOPHIL # BLD AUTO: 0.2 K/UL — SIGNIFICANT CHANGE UP (ref 0–0.5)
EOSINOPHIL NFR BLD AUTO: 3.1 % — SIGNIFICANT CHANGE UP (ref 0–6)
HCT VFR BLD CALC: 42 % — SIGNIFICANT CHANGE UP (ref 39–50)
HGB BLD-MCNC: 13.8 G/DL — SIGNIFICANT CHANGE UP (ref 13–17)
LYMPHOCYTES # BLD AUTO: 1.6 K/UL — SIGNIFICANT CHANGE UP (ref 1–3.3)
LYMPHOCYTES # BLD AUTO: 31.1 % — SIGNIFICANT CHANGE UP (ref 13–44)
MCHC RBC-ENTMCNC: 32.1 PG — SIGNIFICANT CHANGE UP (ref 27–34)
MCHC RBC-ENTMCNC: 32.9 G/DL — SIGNIFICANT CHANGE UP (ref 32–36)
MCV RBC AUTO: 97.8 FL — SIGNIFICANT CHANGE UP (ref 80–100)
MONOCYTES # BLD AUTO: 0.4 K/UL — SIGNIFICANT CHANGE UP (ref 0–0.9)
MONOCYTES NFR BLD AUTO: 7.4 % — SIGNIFICANT CHANGE UP (ref 2–14)
NEUTROPHILS # BLD AUTO: 3 K/UL — SIGNIFICANT CHANGE UP (ref 1.8–7.4)
NEUTROPHILS NFR BLD AUTO: 56.8 % — SIGNIFICANT CHANGE UP (ref 43–77)
PLATELET # BLD AUTO: 78 K/UL — LOW (ref 150–400)
RBC # BLD: 4.29 M/UL — SIGNIFICANT CHANGE UP (ref 4.2–5.8)
RBC # FLD: 13.1 % — SIGNIFICANT CHANGE UP (ref 10.3–14.5)
WBC # BLD: 5.3 K/UL — SIGNIFICANT CHANGE UP (ref 3.8–10.5)
WBC # FLD AUTO: 5.3 K/UL — SIGNIFICANT CHANGE UP (ref 3.8–10.5)

## 2024-01-23 PROCEDURE — 99214 OFFICE O/P EST MOD 30 MIN: CPT

## 2024-01-23 NOTE — HISTORY OF PRESENT ILLNESS
[de-identified] : 66 M here for evaluation of thrombocytopenia. Labs on 6/2021 showed low plt, increased liver enzyme.  Pt reports that his plts were low at least for the past 5 years ago. Never require treatment.\par  3/9/2021: plt 98, normal wbc, hgb,\par  5/6/2020; plt 130\par  Pt is a current everyday smoker, not ready to quit. Denies any hematologic disorder in the family. No easy bruising. Denies HA. CP, SOB, abd pain, constipation, diarrhea, melena, hematuria, dysuria. \par  \par  \par  PCP Erwin Napier [phone] [de-identified] : Patient presents for follow up regarding thrombocytopenia. PLTs 66 today Intermittent R LE pain much improved s/p LE stent.  Again denies any easy bruising or petechiae. No recent fevers, chills. He reports he will be going for an EGD in the next few months but it has not been scheduled.  Still smokes about 5 cigs a day  10/17/23: Mr Rankin is here for follow up. He said he went for colonoscopy and they found polyps, but no cancer.  Today his plts are 104 and he is doing well with no new complaints/  01/23/24: Mr Rankin returns for follow up.  He has been doing well overall, no bleeding issues. Will be going for endoscopy and colonscopy soon.  CBC pending

## 2024-01-23 NOTE — ASSESSMENT
[FreeTextEntry1] : 66 M here for evaluation of thrombocytopenia. Labs on 6/2021 showed low plt, increased liver enzyme. Pt reports that his plts were low at least for the past 5 years ago. Never require treatment. 3/9/2021: plt 98, normal wbc, hgb, 5/6/2020; plt 130  # Thrombocytopenia -likely ITP given the chronicity (5 years) and normal WBC, Hgb. -negative hepatitis panel -PBS reviewed 10/2021: normal RBC with appropriate central pallor, no schistocytes.Granulocytes, lymphocytes appear normal morphologically, no increased in immature cells, decreased platelets, no clumping -underlying hematologic cause is unlikely -normal iron, B12, folate  01/23/24: Mr Rankin returns for follow up.  He has been doing well overall, no bleeding issues. Will be going for endoscopy and colonscopy soon.  CBC pending # Smoking Cessation -again counseled pt on reducing smoking -advised that his PAD was caused by smoking  PCP Erwin Napier [phone].

## 2024-02-29 ENCOUNTER — TRANSCRIPTION ENCOUNTER (OUTPATIENT)
Age: 69
End: 2024-02-29

## 2024-03-01 ENCOUNTER — OUTPATIENT (OUTPATIENT)
Dept: OUTPATIENT SERVICES | Facility: HOSPITAL | Age: 69
LOS: 1 days | End: 2024-03-01
Payer: MEDICARE

## 2024-03-01 DIAGNOSIS — R13.19 OTHER DYSPHAGIA: ICD-10-CM

## 2024-03-01 DIAGNOSIS — Z98.890 OTHER SPECIFIED POSTPROCEDURAL STATES: Chronic | ICD-10-CM

## 2024-03-01 PROCEDURE — C1889: CPT

## 2024-03-01 PROCEDURE — 43235 EGD DIAGNOSTIC BRUSH WASH: CPT

## 2024-03-01 PROCEDURE — 91040 ESOPH BALLOON DISTENSION TST: CPT

## 2024-03-01 DEVICE — CATH ENDOFLIP MEASURE 16MM: Type: IMPLANTABLE DEVICE | Status: FUNCTIONAL

## 2024-03-26 ENCOUNTER — RX RENEWAL (OUTPATIENT)
Age: 69
End: 2024-03-26

## 2024-03-27 ENCOUNTER — APPOINTMENT (OUTPATIENT)
Dept: CARDIOLOGY | Facility: CLINIC | Age: 69
End: 2024-03-27
Payer: MEDICARE

## 2024-03-27 ENCOUNTER — NON-APPOINTMENT (OUTPATIENT)
Age: 69
End: 2024-03-27

## 2024-03-27 ENCOUNTER — TRANSCRIPTION ENCOUNTER (OUTPATIENT)
Age: 69
End: 2024-03-27

## 2024-03-27 VITALS — WEIGHT: 133 LBS | HEIGHT: 72 IN | BODY MASS INDEX: 18.01 KG/M2

## 2024-03-27 VITALS — HEART RATE: 104 BPM | SYSTOLIC BLOOD PRESSURE: 120 MMHG | DIASTOLIC BLOOD PRESSURE: 82 MMHG | OXYGEN SATURATION: 97 %

## 2024-03-27 DIAGNOSIS — I73.9 PERIPHERAL VASCULAR DISEASE, UNSPECIFIED: ICD-10-CM

## 2024-03-27 DIAGNOSIS — D69.6 THROMBOCYTOPENIA, UNSPECIFIED: ICD-10-CM

## 2024-03-27 DIAGNOSIS — R00.0 TACHYCARDIA, UNSPECIFIED: ICD-10-CM

## 2024-03-27 PROCEDURE — 99214 OFFICE O/P EST MOD 30 MIN: CPT | Mod: 25

## 2024-03-27 PROCEDURE — 93000 ELECTROCARDIOGRAM COMPLETE: CPT

## 2024-03-27 RX ORDER — ALBUTEROL 90 MCG
90 AEROSOL (GRAM) INHALATION DAILY
Refills: 0 | Status: ACTIVE | COMMUNITY

## 2024-03-27 RX ORDER — MONTELUKAST 10 MG/1
10 TABLET, FILM COATED ORAL DAILY
Refills: 0 | Status: ACTIVE | COMMUNITY

## 2024-03-27 RX ORDER — PANTOPRAZOLE 40 MG/1
40 TABLET, DELAYED RELEASE ORAL
Qty: 30 | Refills: 0 | Status: DISCONTINUED | COMMUNITY
Start: 2023-07-05 | End: 2024-03-27

## 2024-04-08 ENCOUNTER — OUTPATIENT (OUTPATIENT)
Dept: OUTPATIENT SERVICES | Facility: HOSPITAL | Age: 69
LOS: 1 days | Discharge: HOME | End: 2024-04-08

## 2024-04-08 DIAGNOSIS — D69.6 THROMBOCYTOPENIA, UNSPECIFIED: ICD-10-CM

## 2024-04-08 DIAGNOSIS — Z98.890 OTHER SPECIFIED POSTPROCEDURAL STATES: Chronic | ICD-10-CM

## 2024-04-16 ENCOUNTER — RESULT REVIEW (OUTPATIENT)
Age: 69
End: 2024-04-16

## 2024-04-16 ENCOUNTER — APPOINTMENT (OUTPATIENT)
Dept: HEMATOLOGY ONCOLOGY | Facility: CLINIC | Age: 69
End: 2024-04-16
Payer: MEDICARE

## 2024-04-16 VITALS
BODY MASS INDEX: 18.28 KG/M2 | SYSTOLIC BLOOD PRESSURE: 121 MMHG | HEIGHT: 72 IN | WEIGHT: 135 LBS | HEART RATE: 96 BPM | OXYGEN SATURATION: 96 % | DIASTOLIC BLOOD PRESSURE: 87 MMHG

## 2024-04-16 LAB
BASOPHILS # BLD AUTO: 0 K/UL — SIGNIFICANT CHANGE UP (ref 0–0.2)
BASOPHILS NFR BLD AUTO: 0.5 % — SIGNIFICANT CHANGE UP (ref 0–2)
EOSINOPHIL # BLD AUTO: 0.1 K/UL — SIGNIFICANT CHANGE UP (ref 0–0.5)
EOSINOPHIL NFR BLD AUTO: 0.9 % — SIGNIFICANT CHANGE UP (ref 0–6)
HCT VFR BLD CALC: 43.3 % — SIGNIFICANT CHANGE UP (ref 39–50)
HGB BLD-MCNC: 15 G/DL — SIGNIFICANT CHANGE UP (ref 13–17)
LYMPHOCYTES # BLD AUTO: 1.9 K/UL — SIGNIFICANT CHANGE UP (ref 1–3.3)
LYMPHOCYTES # BLD AUTO: 28.3 % — SIGNIFICANT CHANGE UP (ref 13–44)
MCHC RBC-ENTMCNC: 32.6 PG — SIGNIFICANT CHANGE UP (ref 27–34)
MCHC RBC-ENTMCNC: 34.6 G/DL — SIGNIFICANT CHANGE UP (ref 32–36)
MCV RBC AUTO: 94.2 FL — SIGNIFICANT CHANGE UP (ref 80–100)
MONOCYTES # BLD AUTO: 0.4 K/UL — SIGNIFICANT CHANGE UP (ref 0–0.9)
MONOCYTES NFR BLD AUTO: 6.4 % — SIGNIFICANT CHANGE UP (ref 2–14)
NEUTROPHILS # BLD AUTO: 4.3 K/UL — SIGNIFICANT CHANGE UP (ref 1.8–7.4)
NEUTROPHILS NFR BLD AUTO: 63.9 % — SIGNIFICANT CHANGE UP (ref 43–77)
PLATELET # BLD AUTO: 83 K/UL — LOW (ref 150–400)
RBC # BLD: 4.6 M/UL — SIGNIFICANT CHANGE UP (ref 4.2–5.8)
RBC # FLD: 12.1 % — SIGNIFICANT CHANGE UP (ref 10.3–14.5)
WBC # BLD: 6.7 K/UL — SIGNIFICANT CHANGE UP (ref 3.8–10.5)
WBC # FLD AUTO: 6.7 K/UL — SIGNIFICANT CHANGE UP (ref 3.8–10.5)

## 2024-04-16 PROCEDURE — 99213 OFFICE O/P EST LOW 20 MIN: CPT

## 2024-04-16 NOTE — ASSESSMENT
[FreeTextEntry1] : 66 M here for evaluation of thrombocytopenia. Labs on 6/2021 showed low plt, increased liver enzyme. Pt reports that his plts were low at least for the past 5 years ago. Never require treatment. 3/9/2021: plt 98, normal wbc, hgb, 5/6/2020; plt 130  # Thrombocytopenia -likely ITP given the chronicity (5 years) and normal WBC, Hgb. -negative hepatitis panel -PBS reviewed 10/2021: normal RBC with appropriate central pallor, no schistocytes.Granulocytes, lymphocytes appear normal morphologically, no increased in immature cells, decreased platelets, no clumping -underlying hematologic cause is unlikely -normal iron, B12, folate  01/23/24: Mr Rankin returns for follow up.  He has been doing well overall, no bleeding issues. Will be going for  colonscopy soon.  CBC pending  # Smoking Cessation -again counseled pt on reducing smoking -advised that his PAD was caused by smoking  PCP Erwin Napier [phone].

## 2024-04-16 NOTE — HISTORY OF PRESENT ILLNESS
[de-identified] : 66 M here for evaluation of thrombocytopenia. Labs on 6/2021 showed low plt, increased liver enzyme.  Pt reports that his plts were low at least for the past 5 years ago. Never require treatment.\par  3/9/2021: plt 98, normal wbc, hgb,\par  5/6/2020; plt 130\par  Pt is a current everyday smoker, not ready to quit. Denies any hematologic disorder in the family. No easy bruising. Denies HA. CP, SOB, abd pain, constipation, diarrhea, melena, hematuria, dysuria. \par  \par  \par  PCP Erwin Napier [phone] [de-identified] : Patient presents for follow up regarding thrombocytopenia. PLTs 66 today Intermittent R LE pain much improved s/p LE stent.  Again denies any easy bruising or petechiae. No recent fevers, chills. He reports he will be going for an EGD in the next few months but it has not been scheduled.  Still smokes about 5 cigs a day  10/17/23: Mr Rankin is here for follow up. He said he went for colonoscopy and they found polyps, but no cancer.  Today his plts are 104 and he is doing well with no new complaints/  01/23/24: Mr Rankin returns for follow up.  He has been doing well overall, no bleeding issues. Will be going for endoscopy and colonscopy soon.  CBC pending  01/23/24: Mr Rankin returns for follow up.  He has been doing well overall, no bleeding issues. CBC pending

## 2024-04-24 RX ORDER — ATORVASTATIN CALCIUM 40 MG/1
40 TABLET, FILM COATED ORAL DAILY
Qty: 90 | Refills: 0 | Status: DISCONTINUED | COMMUNITY
Start: 1900-01-01 | End: 2024-04-24

## 2024-05-08 RX ORDER — ROSUVASTATIN CALCIUM 10 MG/1
10 TABLET, FILM COATED ORAL
Qty: 90 | Refills: 1 | Status: ACTIVE | COMMUNITY
Start: 2024-05-08 | End: 1900-01-01

## 2024-07-02 ENCOUNTER — OUTPATIENT (OUTPATIENT)
Dept: OUTPATIENT SERVICES | Facility: HOSPITAL | Age: 69
LOS: 1 days | Discharge: HOME | End: 2024-07-02

## 2024-07-02 DIAGNOSIS — D69.6 THROMBOCYTOPENIA, UNSPECIFIED: ICD-10-CM

## 2024-07-02 DIAGNOSIS — Z98.890 OTHER SPECIFIED POSTPROCEDURAL STATES: Chronic | ICD-10-CM

## 2024-07-04 ENCOUNTER — NON-APPOINTMENT (OUTPATIENT)
Age: 69
End: 2024-07-04

## 2024-07-08 ENCOUNTER — NON-APPOINTMENT (OUTPATIENT)
Age: 69
End: 2024-07-08

## 2024-07-09 ENCOUNTER — RESULT REVIEW (OUTPATIENT)
Age: 69
End: 2024-07-09

## 2024-07-09 ENCOUNTER — APPOINTMENT (OUTPATIENT)
Dept: HEMATOLOGY ONCOLOGY | Facility: CLINIC | Age: 69
End: 2024-07-09
Payer: MEDICARE

## 2024-07-09 VITALS
BODY MASS INDEX: 17.9 KG/M2 | HEART RATE: 92 BPM | HEIGHT: 72 IN | SYSTOLIC BLOOD PRESSURE: 128 MMHG | OXYGEN SATURATION: 96 % | DIASTOLIC BLOOD PRESSURE: 87 MMHG | WEIGHT: 132.16 LBS

## 2024-07-09 DIAGNOSIS — D69.6 THROMBOCYTOPENIA, UNSPECIFIED: ICD-10-CM

## 2024-07-09 LAB
BASOPHILS # BLD AUTO: 0 K/UL — SIGNIFICANT CHANGE UP (ref 0–0.2)
BASOPHILS NFR BLD AUTO: 0.8 % — SIGNIFICANT CHANGE UP (ref 0–2)
EOSINOPHIL # BLD AUTO: 0 K/UL — SIGNIFICANT CHANGE UP (ref 0–0.5)
EOSINOPHIL NFR BLD AUTO: 0.8 % — SIGNIFICANT CHANGE UP (ref 0–6)
HCT VFR BLD CALC: 45.2 % — SIGNIFICANT CHANGE UP (ref 39–50)
HGB BLD-MCNC: 15.3 G/DL — SIGNIFICANT CHANGE UP (ref 13–17)
LYMPHOCYTES # BLD AUTO: 1.5 K/UL — SIGNIFICANT CHANGE UP (ref 1–3.3)
LYMPHOCYTES # BLD AUTO: 28.4 % — SIGNIFICANT CHANGE UP (ref 13–44)
MCHC RBC-ENTMCNC: 32.4 PG — SIGNIFICANT CHANGE UP (ref 27–34)
MCHC RBC-ENTMCNC: 33.8 G/DL — SIGNIFICANT CHANGE UP (ref 32–36)
MCV RBC AUTO: 96 FL — SIGNIFICANT CHANGE UP (ref 80–100)
MONOCYTES # BLD AUTO: 0.4 K/UL — SIGNIFICANT CHANGE UP (ref 0–0.9)
MONOCYTES NFR BLD AUTO: 7.1 % — SIGNIFICANT CHANGE UP (ref 2–14)
NEUTROPHILS # BLD AUTO: 3.4 K/UL — SIGNIFICANT CHANGE UP (ref 1.8–7.4)
NEUTROPHILS NFR BLD AUTO: 63 % — SIGNIFICANT CHANGE UP (ref 43–77)
PLATELET # BLD AUTO: 76 K/UL — LOW (ref 150–400)
RBC # BLD: 4.71 M/UL — SIGNIFICANT CHANGE UP (ref 4.2–5.8)
RBC # FLD: 12.2 % — SIGNIFICANT CHANGE UP (ref 10.3–14.5)
WBC # BLD: 5.3 K/UL — SIGNIFICANT CHANGE UP (ref 3.8–10.5)
WBC # FLD AUTO: 5.3 K/UL — SIGNIFICANT CHANGE UP (ref 3.8–10.5)

## 2024-07-09 PROCEDURE — 99212 OFFICE O/P EST SF 10 MIN: CPT

## 2024-07-22 ENCOUNTER — OFFICE (OUTPATIENT)
Dept: URBAN - METROPOLITAN AREA CLINIC 115 | Facility: CLINIC | Age: 69
Setting detail: OPHTHALMOLOGY
End: 2024-07-22
Payer: COMMERCIAL

## 2024-07-22 DIAGNOSIS — H40.003: ICD-10-CM

## 2024-07-22 DIAGNOSIS — H01.004: ICD-10-CM

## 2024-07-22 DIAGNOSIS — H01.001: ICD-10-CM

## 2024-07-22 DIAGNOSIS — H25.13: ICD-10-CM

## 2024-07-22 PROCEDURE — 92014 COMPRE OPH EXAM EST PT 1/>: CPT | Performed by: OPHTHALMOLOGY

## 2024-07-22 PROCEDURE — 92133 CPTRZD OPH DX IMG PST SGM ON: CPT | Performed by: OPHTHALMOLOGY

## 2024-07-22 ASSESSMENT — LID EXAM ASSESSMENTS
OS_BLEPHARITIS: LUL T
OD_BLEPHARITIS: RUL T

## 2024-07-22 ASSESSMENT — CONFRONTATIONAL VISUAL FIELD TEST (CVF)
OD_FINDINGS: FULL
OS_FINDINGS: FULL

## 2024-09-25 ENCOUNTER — APPOINTMENT (OUTPATIENT)
Dept: CARDIOLOGY | Facility: CLINIC | Age: 69
End: 2024-09-25
Payer: MEDICARE

## 2024-09-25 VITALS
BODY MASS INDEX: 17.31 KG/M2 | HEART RATE: 78 BPM | SYSTOLIC BLOOD PRESSURE: 132 MMHG | WEIGHT: 127.8 LBS | DIASTOLIC BLOOD PRESSURE: 88 MMHG | HEIGHT: 72 IN | OXYGEN SATURATION: 98 %

## 2024-09-25 DIAGNOSIS — I73.9 PERIPHERAL VASCULAR DISEASE, UNSPECIFIED: ICD-10-CM

## 2024-09-25 DIAGNOSIS — F17.200 NICOTINE DEPENDENCE, UNSPECIFIED, UNCOMPLICATED: ICD-10-CM

## 2024-09-25 DIAGNOSIS — R06.09 OTHER FORMS OF DYSPNEA: ICD-10-CM

## 2024-09-25 DIAGNOSIS — J44.9 CHRONIC OBSTRUCTIVE PULMONARY DISEASE, UNSPECIFIED: ICD-10-CM

## 2024-09-25 DIAGNOSIS — E78.5 HYPERLIPIDEMIA, UNSPECIFIED: ICD-10-CM

## 2024-09-25 PROCEDURE — 99214 OFFICE O/P EST MOD 30 MIN: CPT | Mod: 25

## 2024-09-25 PROCEDURE — 93000 ELECTROCARDIOGRAM COMPLETE: CPT

## 2024-09-25 RX ORDER — LACTASE 5000/ML
DROPS ORAL
Refills: 0 | Status: ACTIVE | COMMUNITY

## 2024-09-25 NOTE — DISCUSSION/SUMMARY
[FreeTextEntry1] : 1) PAD with claudication: Has past RIGHT SFA  PCI with a stent and now has left leg severe lifestyle limiting claudication despite walking therapy and medication with a recent left Duplex showing severe stenosis in his distal left SFA.  Will plan for catheterization and PCI of left SFA. C/w crestor 10, clopidogrel 75, Cilostazol 100mg BID.  2) BOONE: Has multiple risk factors for CAD. Plan for stress test. Pharmacological since PAD and has trouble walking without pain  3) HLD: C/w crestor 10 QD 4) Smoker: Continues to smokes, but as cut back to 5 cigarettes per day  5) COPD: On inhalers  [EKG obtained to assist in diagnosis and management of assessed problem(s)] : EKG obtained to assist in diagnosis and management of assessed problem(s)

## 2024-09-25 NOTE — HISTORY OF PRESENT ILLNESS
[FreeTextEntry1] : 69 year old male with PAD s/p Right SFA intervention here for PAD follow up.  He is having some pain in his left leg. No pain in right leg.  Smokes 5 cigarettes a day. No chest pain.  Has some SOB.  has some left leg pain and heaviness when he walks.

## 2024-09-25 NOTE — PHYSICAL EXAM
[Well Developed] : well developed [Well Nourished] : well nourished [No Carotid Bruit] : no carotid bruit [Normal S1, S2] : normal S1, S2 [No Murmur] : no murmur [Clear Lung Fields] : clear lung fields [Soft] : abdomen soft [No Rash] : no rash [Moves all extremities] : moves all extremities [Alert and Oriented] : alert and oriented [de-identified] : walks with cane  [de-identified] : weak pulse on left

## 2024-10-09 ENCOUNTER — APPOINTMENT (OUTPATIENT)
Dept: CARDIOLOGY | Facility: CLINIC | Age: 69
End: 2024-10-09
Payer: MEDICARE

## 2024-10-09 ENCOUNTER — OUTPATIENT (OUTPATIENT)
Dept: OUTPATIENT SERVICES | Facility: HOSPITAL | Age: 69
LOS: 1 days | Discharge: HOME | End: 2024-10-09

## 2024-10-09 VITALS
OXYGEN SATURATION: 98 % | DIASTOLIC BLOOD PRESSURE: 76 MMHG | BODY MASS INDEX: 17.2 KG/M2 | SYSTOLIC BLOOD PRESSURE: 115 MMHG | HEIGHT: 72 IN | HEART RATE: 80 BPM | WEIGHT: 127 LBS

## 2024-10-09 DIAGNOSIS — I73.9 PERIPHERAL VASCULAR DISEASE, UNSPECIFIED: ICD-10-CM

## 2024-10-09 DIAGNOSIS — Z98.890 OTHER SPECIFIED POSTPROCEDURAL STATES: Chronic | ICD-10-CM

## 2024-10-09 DIAGNOSIS — D69.6 THROMBOCYTOPENIA, UNSPECIFIED: ICD-10-CM

## 2024-10-09 DIAGNOSIS — R06.09 OTHER FORMS OF DYSPNEA: ICD-10-CM

## 2024-10-09 PROCEDURE — 99214 OFFICE O/P EST MOD 30 MIN: CPT

## 2024-10-09 RX ORDER — LACTULOSE 10 G/15ML
10 SOLUTION ORAL AS DIRECTED
Refills: 0 | Status: ACTIVE | COMMUNITY

## 2024-10-10 ENCOUNTER — RESULT REVIEW (OUTPATIENT)
Age: 69
End: 2024-10-10

## 2024-10-10 ENCOUNTER — APPOINTMENT (OUTPATIENT)
Dept: HEMATOLOGY ONCOLOGY | Facility: CLINIC | Age: 69
End: 2024-10-10
Payer: MEDICARE

## 2024-10-10 VITALS
WEIGHT: 126.44 LBS | BODY MASS INDEX: 17.13 KG/M2 | HEIGHT: 72 IN | OXYGEN SATURATION: 97 % | HEART RATE: 80 BPM | SYSTOLIC BLOOD PRESSURE: 120 MMHG | TEMPERATURE: 98.2 F | DIASTOLIC BLOOD PRESSURE: 81 MMHG

## 2024-10-10 DIAGNOSIS — D69.6 THROMBOCYTOPENIA, UNSPECIFIED: ICD-10-CM

## 2024-10-10 LAB
BASOPHILS # BLD AUTO: 0.1 K/UL — SIGNIFICANT CHANGE UP (ref 0–0.2)
BASOPHILS NFR BLD AUTO: 0.9 % — SIGNIFICANT CHANGE UP (ref 0–2)
EOSINOPHIL # BLD AUTO: 0.1 K/UL — SIGNIFICANT CHANGE UP (ref 0–0.5)
EOSINOPHIL NFR BLD AUTO: 1.9 % — SIGNIFICANT CHANGE UP (ref 0–6)
HCT VFR BLD CALC: 46.1 % — SIGNIFICANT CHANGE UP (ref 39–50)
HGB BLD-MCNC: 15.2 G/DL — SIGNIFICANT CHANGE UP (ref 13–17)
LYMPHOCYTES # BLD AUTO: 1.7 K/UL — SIGNIFICANT CHANGE UP (ref 1–3.3)
LYMPHOCYTES # BLD AUTO: 29.4 % — SIGNIFICANT CHANGE UP (ref 13–44)
MCHC RBC-ENTMCNC: 32 PG — SIGNIFICANT CHANGE UP (ref 27–34)
MCHC RBC-ENTMCNC: 32.9 G/DL — SIGNIFICANT CHANGE UP (ref 32–36)
MCV RBC AUTO: 97 FL — SIGNIFICANT CHANGE UP (ref 80–100)
MONOCYTES # BLD AUTO: 0.5 K/UL — SIGNIFICANT CHANGE UP (ref 0–0.9)
MONOCYTES NFR BLD AUTO: 7.8 % — SIGNIFICANT CHANGE UP (ref 2–14)
NEUTROPHILS # BLD AUTO: 3.6 K/UL — SIGNIFICANT CHANGE UP (ref 1.8–7.4)
NEUTROPHILS NFR BLD AUTO: 60 % — SIGNIFICANT CHANGE UP (ref 43–77)
PLATELET # BLD AUTO: 74 K/UL — LOW (ref 150–400)
RBC # BLD: 4.75 M/UL — SIGNIFICANT CHANGE UP (ref 4.2–5.8)
RBC # FLD: 12.4 % — SIGNIFICANT CHANGE UP (ref 10.3–14.5)
WBC # BLD: 5.9 K/UL — SIGNIFICANT CHANGE UP (ref 3.8–10.5)
WBC # FLD AUTO: 5.9 K/UL — SIGNIFICANT CHANGE UP (ref 3.8–10.5)

## 2024-10-10 PROCEDURE — 99214 OFFICE O/P EST MOD 30 MIN: CPT

## 2024-10-15 ENCOUNTER — RX RENEWAL (OUTPATIENT)
Age: 69
End: 2024-10-15

## 2024-10-25 ENCOUNTER — NON-APPOINTMENT (OUTPATIENT)
Age: 69
End: 2024-10-25

## 2024-10-31 ENCOUNTER — OUTPATIENT (OUTPATIENT)
Dept: OUTPATIENT SERVICES | Facility: HOSPITAL | Age: 69
LOS: 1 days | End: 2024-10-31
Payer: MEDICARE

## 2024-10-31 VITALS
HEART RATE: 99 BPM | WEIGHT: 121.7 LBS | SYSTOLIC BLOOD PRESSURE: 126 MMHG | TEMPERATURE: 98 F | HEIGHT: 73 IN | OXYGEN SATURATION: 96 % | DIASTOLIC BLOOD PRESSURE: 70 MMHG | RESPIRATION RATE: 18 BRPM

## 2024-10-31 DIAGNOSIS — Z01.818 ENCOUNTER FOR OTHER PREPROCEDURAL EXAMINATION: ICD-10-CM

## 2024-10-31 DIAGNOSIS — Z98.890 OTHER SPECIFIED POSTPROCEDURAL STATES: Chronic | ICD-10-CM

## 2024-10-31 LAB
A1C WITH ESTIMATED AVERAGE GLUCOSE RESULT: 5.8 % — HIGH (ref 4–5.6)
ALBUMIN SERPL ELPH-MCNC: 4.3 G/DL — SIGNIFICANT CHANGE UP (ref 3.3–5.2)
ALP SERPL-CCNC: 107 U/L — SIGNIFICANT CHANGE UP (ref 40–120)
ALT FLD-CCNC: 68 U/L — HIGH
ANION GAP SERPL CALC-SCNC: 11 MMOL/L — SIGNIFICANT CHANGE UP (ref 5–17)
APTT BLD: 33.8 SEC — SIGNIFICANT CHANGE UP (ref 24.5–35.6)
AST SERPL-CCNC: 51 U/L — HIGH
BASOPHILS # BLD AUTO: 0.03 K/UL — SIGNIFICANT CHANGE UP (ref 0–0.2)
BASOPHILS NFR BLD AUTO: 0.6 % — SIGNIFICANT CHANGE UP (ref 0–2)
BILIRUB SERPL-MCNC: 0.4 MG/DL — SIGNIFICANT CHANGE UP (ref 0.4–2)
BLD GP AB SCN SERPL QL: SIGNIFICANT CHANGE UP
BUN SERPL-MCNC: 18.1 MG/DL — SIGNIFICANT CHANGE UP (ref 8–20)
CALCIUM SERPL-MCNC: 9.4 MG/DL — SIGNIFICANT CHANGE UP (ref 8.4–10.5)
CHLORIDE SERPL-SCNC: 104 MMOL/L — SIGNIFICANT CHANGE UP (ref 96–108)
CHOLEST SERPL-MCNC: 140 MG/DL — SIGNIFICANT CHANGE UP
CO2 SERPL-SCNC: 25 MMOL/L — SIGNIFICANT CHANGE UP (ref 22–29)
CREAT SERPL-MCNC: 1.02 MG/DL — SIGNIFICANT CHANGE UP (ref 0.5–1.3)
EGFR: 81 ML/MIN/1.73M2 — SIGNIFICANT CHANGE UP
EOSINOPHIL # BLD AUTO: 0.06 K/UL — SIGNIFICANT CHANGE UP (ref 0–0.5)
EOSINOPHIL NFR BLD AUTO: 1.2 % — SIGNIFICANT CHANGE UP (ref 0–6)
ESTIMATED AVERAGE GLUCOSE: 120 MG/DL — HIGH (ref 68–114)
GLUCOSE SERPL-MCNC: 127 MG/DL — HIGH (ref 70–99)
HCT VFR BLD CALC: 43.1 % — SIGNIFICANT CHANGE UP (ref 39–50)
HDLC SERPL-MCNC: 62 MG/DL — SIGNIFICANT CHANGE UP
HGB BLD-MCNC: 14.5 G/DL — SIGNIFICANT CHANGE UP (ref 13–17)
IMM GRANULOCYTES NFR BLD AUTO: 0.4 % — SIGNIFICANT CHANGE UP (ref 0–0.9)
INR BLD: 0.98 RATIO — SIGNIFICANT CHANGE UP (ref 0.85–1.16)
LIPID PNL WITH DIRECT LDL SERPL: 66 MG/DL — SIGNIFICANT CHANGE UP
LYMPHOCYTES # BLD AUTO: 1.29 K/UL — SIGNIFICANT CHANGE UP (ref 1–3.3)
LYMPHOCYTES # BLD AUTO: 25.4 % — SIGNIFICANT CHANGE UP (ref 13–44)
MAGNESIUM SERPL-MCNC: 2 MG/DL — SIGNIFICANT CHANGE UP (ref 1.6–2.6)
MCHC RBC-ENTMCNC: 30.5 PG — SIGNIFICANT CHANGE UP (ref 27–34)
MCHC RBC-ENTMCNC: 33.6 G/DL — SIGNIFICANT CHANGE UP (ref 32–36)
MCV RBC AUTO: 90.5 FL — SIGNIFICANT CHANGE UP (ref 80–100)
MONOCYTES # BLD AUTO: 0.37 K/UL — SIGNIFICANT CHANGE UP (ref 0–0.9)
MONOCYTES NFR BLD AUTO: 7.3 % — SIGNIFICANT CHANGE UP (ref 2–14)
NEUTROPHILS # BLD AUTO: 3.3 K/UL — SIGNIFICANT CHANGE UP (ref 1.8–7.4)
NEUTROPHILS NFR BLD AUTO: 65.1 % — SIGNIFICANT CHANGE UP (ref 43–77)
NON HDL CHOLESTEROL: 78 MG/DL — SIGNIFICANT CHANGE UP
PLATELET # BLD AUTO: 65 K/UL — LOW (ref 150–400)
POTASSIUM SERPL-MCNC: 4.4 MMOL/L — SIGNIFICANT CHANGE UP (ref 3.5–5.3)
POTASSIUM SERPL-SCNC: 4.4 MMOL/L — SIGNIFICANT CHANGE UP (ref 3.5–5.3)
PROT SERPL-MCNC: 7.1 G/DL — SIGNIFICANT CHANGE UP (ref 6.6–8.7)
PROTHROM AB SERPL-ACNC: 11.4 SEC — SIGNIFICANT CHANGE UP (ref 9.9–13.4)
RBC # BLD: 4.76 M/UL — SIGNIFICANT CHANGE UP (ref 4.2–5.8)
RBC # FLD: 14.5 % — SIGNIFICANT CHANGE UP (ref 10.3–14.5)
SODIUM SERPL-SCNC: 140 MMOL/L — SIGNIFICANT CHANGE UP (ref 135–145)
TRIGL SERPL-MCNC: 59 MG/DL — SIGNIFICANT CHANGE UP
WBC # BLD: 5.07 K/UL — SIGNIFICANT CHANGE UP (ref 3.8–10.5)
WBC # FLD AUTO: 5.07 K/UL — SIGNIFICANT CHANGE UP (ref 3.8–10.5)

## 2024-10-31 PROCEDURE — 85730 THROMBOPLASTIN TIME PARTIAL: CPT

## 2024-10-31 PROCEDURE — 86850 RBC ANTIBODY SCREEN: CPT

## 2024-10-31 PROCEDURE — 83036 HEMOGLOBIN GLYCOSYLATED A1C: CPT

## 2024-10-31 PROCEDURE — 80053 COMPREHEN METABOLIC PANEL: CPT

## 2024-10-31 PROCEDURE — 93010 ELECTROCARDIOGRAM REPORT: CPT

## 2024-10-31 PROCEDURE — 80061 LIPID PANEL: CPT

## 2024-10-31 PROCEDURE — 85025 COMPLETE CBC W/AUTO DIFF WBC: CPT

## 2024-10-31 PROCEDURE — G0463: CPT

## 2024-10-31 PROCEDURE — 86901 BLOOD TYPING SEROLOGIC RH(D): CPT

## 2024-10-31 PROCEDURE — 86900 BLOOD TYPING SEROLOGIC ABO: CPT

## 2024-10-31 PROCEDURE — 93005 ELECTROCARDIOGRAM TRACING: CPT

## 2024-10-31 PROCEDURE — 36415 COLL VENOUS BLD VENIPUNCTURE: CPT

## 2024-10-31 PROCEDURE — 85610 PROTHROMBIN TIME: CPT

## 2024-10-31 PROCEDURE — 83735 ASSAY OF MAGNESIUM: CPT

## 2024-10-31 NOTE — H&P PST ADULT - NSICDXPASTMEDICALHX_GEN_ALL_CORE_FT
PAST MEDICAL HISTORY:  Back pain     COPD (chronic obstructive pulmonary disease)     GERD (gastroesophageal reflux disease)     Mixed hyperlipidemia     PAD (peripheral artery disease)     Thrombocytopenia

## 2024-10-31 NOTE — H&P PST ADULT - HISTORY OF PRESENT ILLNESS
Narrative: This is a 69 year old male with PMH of COPD, GERD, current smoker ,  and PAD s/p Right SFA stent 2023 still having bilateral claudication despite walking therapy and medications also with a recent left duplex showing severe stenosis in distal left SFA. Now presents to [facility] for upcoming LE angio on 2024 with Dr. Felton. (Of note patient also had abnormal stress test with scan evidence of basal to mid inferior ischemia will also follow with Dr. Felton for eventual LHC). Currently denies chest pain but endorses intermittent exertional chest pain, palps, and claudication occ at rest but worse with exertion, walking less than 2 blocks.       Mark Classification:   Class I: Asymptomatic   Class II a: Mild claudication  Walking > 200 meters (2.5 blocks)   Class IIb: Moderate to Severe claudication Walking < 200 meters (2.5 blocks)   Class III: Rest Pain   Classd IV: Ulceration or gangrene      Associated Risk Factors:     Age: 69    DM: N/A    Smoking history: >50years, cut down to 1/4 pack daily     Hyperlipidemia: N/A    Family history of PAD: N/A     Known Athlerosclerotic disease(coronary, carotid, subclavian, renal, mesenteric, AAA):    Antiplatelets/Anticoagulants:        Plavix:  yes        Cilostazol: yes        pentoxifylinne:        NOAC:     Vascular testin2024    Arterial Dopplers: Right SFA stent patent, distal left SFA focal stenosis approx 60%    CTA/MRi:    Social History:        Marital:  single retired        Tobacco: >50years down to 1/4 pk daily        ETOH: denies       ROS:  CONSTITUTIONAL: No weakness, fevers or chills  EYES/ENT: No visual changes;  No vertigo or throat pain   NECK: No pain or stiffness  RESPIRATORY: +SOB with exertion, chronic   CARDIOVASCULAR: intermittent CP/palps with exertion   GASTROINTESTINAL: No abdominal or epigastric pain. No nausea, vomiting, or hematemesis; No diarrhea or constipation. No melena or hematochezia.  GENITOURINARY: No dysuria, frequency or hematuria  NEUROLOGICAL: No numbness or weakness  EXTREMITIES: no LE wounds, ++claudication     PHYSICAL EXAM:    GENERAL: Pt lying comfortably, NAD.  ENMT: PERRL, +EOMI.  NECK: soft, Supple, No JVD,   CHEST/LUNG: Clear to auscultatation bilaterally; No wheezing.  HEART: S1S2+, Regular rate and rhythm; No murmurs.  ABDOMEN: Soft, Nontender, Nondistended; Bowel sounds present.  MUSCULOSKELETAL: Normal range of motion.  SKIN: No rashes or lesions.  NEURO: AAOX3, no focal deficits, no motor r sensory loss.  PSYCH: normal mood.  VASCULAR:   Femoral +2 R/+2 L  PT +2 R/L faint   DP +2 R/L -    EKG: NSR 99bpm     Labs:    Narrative: This is a 69 year old male with PMH of COPD, GERD, current smoker ,  and PAD s/p Right SFA stent 2023 still having bilateral claudication despite walking therapy and medications also with a recent left duplex showing severe stenosis in distal left SFA. Now presents to [facility] for upcoming LE angio on 2024 with Dr. Felton. (Of note patient also had abnormal stress test with scan evidence of basal to mid inferior ischemia will also follow with Dr. Felton for eventual LHC). Currently denies chest pain but endorses intermittent exertional chest pain, palps, and claudication occ at rest but worse with exertion, walking less than 2 blocks.       Mark Classification:   Class I: Asymptomatic   Class II a: Mild claudication  Walking > 200 meters (2.5 blocks)   Class IIb: Moderate to Severe claudication Walking < 200 meters (2.5 blocks)   Class III: Rest Pain   Classd IV: Ulceration or gangrene      Associated Risk Factors:     Age: 69    DM: N/A    Smoking history: >50years, cut down to 1/4 pack daily     Hyperlipidemia: N/A    Family history of PAD: N/A     Known Athlerosclerotic disease(coronary, carotid, subclavian, renal, mesenteric, AAA):    Antiplatelets/Anticoagulants:        Plavix:  yes        Cilostazol: yes        pentoxifylinne:        NOAC:     Vascular testin2024    Arterial Dopplers: Right SFA stent patent, distal left SFA focal stenosis approx 60%    CTA/MRi:    Social History:        Marital:  single retired        Tobacco: >50years down to 1/4 pk daily        ETOH: denies       ROS:  CONSTITUTIONAL: No weakness, fevers or chills  EYES/ENT: No visual changes;  No vertigo or throat pain   NECK: No pain or stiffness  RESPIRATORY: +SOB with exertion, chronic   CARDIOVASCULAR: intermittent CP/palps with exertion   GASTROINTESTINAL: No abdominal or epigastric pain. No nausea, vomiting, or hematemesis; No diarrhea or constipation. No melena or hematochezia.  GENITOURINARY: No dysuria, frequency or hematuria  NEUROLOGICAL: No numbness or weakness  EXTREMITIES: no LE wounds, ++claudication     PHYSICAL EXAM:    GENERAL: Pt lying comfortably, NAD.  ENMT: PERRL, +EOMI.  NECK: soft, Supple, No JVD,   CHEST/LUNG: Clear to auscultatation bilaterally; No wheezing.  HEART: S1S2+, Regular rate and rhythm; No murmurs.  ABDOMEN: Soft, Nontender, Nondistended; Bowel sounds present.  MUSCULOSKELETAL: Normal range of motion.  SKIN: No rashes or lesions.  NEURO: AAOX3, no focal deficits, no motor r sensory loss.  PSYCH: normal mood.  VASCULAR:   Femoral +2 R/+2 L  PT +2 R/L faint   DP +2 R/L -    EKG: NSR 99bpm     Labs:                         14.5   5.07  )-----------( 65       ( 31 Oct 2024 08:10 )             43.1   10-31    140  |  104  |  18.1  ----------------------------<  127[H]  4.4   |  25.0  |  1.02    Ca    9.4      31 Oct 2024 08:10  Mg     2.0     10-31    TPro  7.1  /  Alb  4.3  /  TBili  0.4  /  DBili  x   /  AST  51[H]  /  ALT  68[H]  /  AlkPhos  107  10-31    Magnesium (10.31.24 @ 08:10)    Magnesium: 2.0 mg/dL

## 2024-10-31 NOTE — H&P PST ADULT - ASSESSMENT
Risk Assessments:  ASA: 3  Mallampati: 2  GFR:   Cr:  BRA:      Impression: known PAD, s/p right SFA stent 1/2023 still with b/l claudication L>R and abnormal arterial duplex suggesting stenosis of distal left SFA     Plan: LLE angio poss intervention     -plan for LLE via RFA   -patient seen and examined  -confirmed appropriate NPO duration  -ECG and Labs reviewed  -plavix 75mg and cilostazol 100 pre procedure   -Normal Saline 0.9%  250ml/hr IV: pre procedure SERGIO ppx   -procedure discussed with patient; risks and benefits explained, questions answered  -consent obtained by attending    Risk Assessments:  ASA: 3  Mallampati: 2  GFR: 81  Cr: 1.02  BRA: 1.5%      Impression: known PAD, s/p right SFA stent 1/2023 still with b/l claudication L>R and abnormal arterial duplex suggesting stenosis of distal left SFA     Plan: LLE angio poss intervention     -plan for LLE via RFA   -patient seen and examined  -confirmed appropriate NPO duration  -ECG and Labs reviewed  -plavix 75mg and cilostazol 100 pre procedure   -Normal Saline 0.9%  250ml/hr IV: pre procedure SERGIO ppx   -procedure discussed with patient; risks and benefits explained, questions answered  -consent obtained by attending

## 2024-10-31 NOTE — H&P PST ADULT - NS ATTEND AMEND GEN_ALL_CORE FT
I have seen and evaluated the patient and agree with the assessment and plan as documented. Left leg claudication with abnormal duplex. Plan for LE angiogram.

## 2024-11-07 ENCOUNTER — TRANSCRIPTION ENCOUNTER (OUTPATIENT)
Age: 69
End: 2024-11-07

## 2024-11-07 ENCOUNTER — NON-APPOINTMENT (OUTPATIENT)
Age: 69
End: 2024-11-07

## 2024-11-07 ENCOUNTER — OUTPATIENT (OUTPATIENT)
Dept: OUTPATIENT SERVICES | Facility: HOSPITAL | Age: 69
LOS: 1 days | End: 2024-11-07
Payer: MEDICARE

## 2024-11-07 VITALS
TEMPERATURE: 98 F | DIASTOLIC BLOOD PRESSURE: 76 MMHG | SYSTOLIC BLOOD PRESSURE: 96 MMHG | RESPIRATION RATE: 15 BRPM | HEART RATE: 84 BPM | OXYGEN SATURATION: 98 %

## 2024-11-07 VITALS
SYSTOLIC BLOOD PRESSURE: 110 MMHG | OXYGEN SATURATION: 99 % | RESPIRATION RATE: 15 BRPM | DIASTOLIC BLOOD PRESSURE: 82 MMHG | HEART RATE: 70 BPM

## 2024-11-07 DIAGNOSIS — I73.9 PERIPHERAL VASCULAR DISEASE, UNSPECIFIED: ICD-10-CM

## 2024-11-07 DIAGNOSIS — Z98.890 OTHER SPECIFIED POSTPROCEDURAL STATES: Chronic | ICD-10-CM

## 2024-11-07 PROCEDURE — 99232 SBSQ HOSP IP/OBS MODERATE 35: CPT | Mod: 25

## 2024-11-07 PROCEDURE — C1894: CPT

## 2024-11-07 PROCEDURE — 99152 MOD SED SAME PHYS/QHP 5/>YRS: CPT

## 2024-11-07 PROCEDURE — C1760: CPT

## 2024-11-07 PROCEDURE — 36246 INS CATH ABD/L-EXT ART 2ND: CPT

## 2024-11-07 PROCEDURE — C1887: CPT

## 2024-11-07 PROCEDURE — 75716 ARTERY X-RAYS ARMS/LEGS: CPT

## 2024-11-07 PROCEDURE — C1769: CPT

## 2024-11-07 PROCEDURE — 95716 VEEG EA 12-26HR CONT MNTR: CPT

## 2024-11-07 PROCEDURE — 36246 INS CATH ABD/L-EXT ART 2ND: CPT | Mod: RT

## 2024-11-07 RX ORDER — B-COMPLEX WITH VITAMIN C
1 VIAL (ML) INJECTION DAILY
Refills: 0 | Status: ACTIVE | OUTPATIENT
Start: 2024-11-07 | End: 2025-10-06

## 2024-11-07 RX ORDER — MONTELUKAST SODIUM 10 MG/1
10 TABLET, FILM COATED ORAL DAILY
Refills: 0 | Status: ACTIVE | OUTPATIENT
Start: 2024-11-07 | End: 2025-10-06

## 2024-11-07 RX ORDER — B-COMPLEX WITH VITAMIN C
1 VIAL (ML) INJECTION
Refills: 0 | DISCHARGE

## 2024-11-07 RX ORDER — FAMOTIDINE 10 MG/ML
40 INJECTION INTRAVENOUS
Refills: 0 | Status: ACTIVE | OUTPATIENT
Start: 2024-11-07 | End: 2025-10-06

## 2024-11-07 RX ORDER — ALBUTEROL 90 MCG
2 AEROSOL (GRAM) INHALATION EVERY 6 HOURS
Refills: 0 | Status: ACTIVE | OUTPATIENT
Start: 2024-11-07 | End: 2025-10-06

## 2024-11-07 RX ORDER — NAPROXEN 250 MG/1
2 TABLET ORAL
Refills: 0 | DISCHARGE

## 2024-11-07 RX ORDER — FAMOTIDINE 10 MG/ML
1 INJECTION INTRAVENOUS
Refills: 0 | DISCHARGE

## 2024-11-07 RX ORDER — NAPROXEN 250 MG/1
500 TABLET ORAL DAILY
Refills: 0 | Status: ACTIVE | OUTPATIENT
Start: 2024-11-07 | End: 2025-10-06

## 2024-11-07 RX ORDER — CLOPIDOGREL 75 MG/1
75 TABLET ORAL DAILY
Refills: 0 | Status: ACTIVE | OUTPATIENT
Start: 2024-11-07 | End: 2025-10-06

## 2024-11-07 RX ORDER — MONTELUKAST SODIUM 10 MG/1
1 TABLET, FILM COATED ORAL
Refills: 0 | DISCHARGE

## 2024-11-07 RX ORDER — ROSUVASTATIN CALCIUM 10 MG
10 TABLET ORAL AT BEDTIME
Refills: 0 | Status: ACTIVE | OUTPATIENT
Start: 2024-11-07 | End: 2025-10-06

## 2024-11-07 RX ORDER — ROSUVASTATIN CALCIUM 10 MG
1 TABLET ORAL
Refills: 0 | DISCHARGE

## 2024-11-07 RX ORDER — CYCLOBENZAPRINE HYDROCHLORIDE 30 MG/1
5 CAPSULE, EXTENDED RELEASE ORAL ONCE
Refills: 0 | Status: ACTIVE | OUTPATIENT
Start: 2024-11-07 | End: 2024-11-07

## 2024-11-07 RX ORDER — CHLORHEXIDINE GLUCONATE 40 MG/ML
1 SOLUTION TOPICAL ONCE
Refills: 0 | Status: ACTIVE | OUTPATIENT
Start: 2024-11-07

## 2024-11-07 RX ORDER — SODIUM CHLORIDE 9 MG/ML
250 INJECTION, SOLUTION INTRAMUSCULAR; INTRAVENOUS; SUBCUTANEOUS ONCE
Refills: 0 | Status: COMPLETED | OUTPATIENT
Start: 2024-11-07 | End: 2024-11-07

## 2024-11-07 RX ADMIN — SODIUM CHLORIDE 500 MILLILITER(S): 9 INJECTION, SOLUTION INTRAMUSCULAR; INTRAVENOUS; SUBCUTANEOUS at 10:51

## 2024-11-07 NOTE — PROGRESS NOTE ADULT - SUBJECTIVE AND OBJECTIVE BOX
Assessment: Presents today for peripheral angiogram   PST done , no interval change, reviewed labs and ECG   Indication: claudication Mark score IIb     ROS: as stated above, otherwise negative    PHYSICAL EXAM:  Constitutional: A & O x 3, NAD  HEENT:  Normal oral mucosa, PERRL, EOMI	  Cardiovascular: S1 S2, no murmur, No JVD  Respiratory: Lungs clear to auscultation	  Gastrointestinal:  Soft, Non-tender, + BS	  Skin: No rashes or cyanosis  Neurologic: No deficit appreciated  Extremities: Normal range of motion, no edema  Vascular: distal pulses +     Risk Stratification:  ASA:   Mallampati:   Bleeding Risk:   Creatinine:   GFR:   Pt assessed, appropriate for sedation, pt educated regarding the plan for Versed/Fentanyl as needed.  Intermediate risk for SERGIO, explained to pt including the possibility of worsening RFT post cath and if no recovery of RF, may need RRT/dialysis. Pt verbalized understanding. Proceed w/informed consent. Optimized renal stand point.    Plan/Recommendations:   -plan for LHC   -preferred access: RFA   -patient seen and examined  -confirmed appropriate NPO duration  -ECG and Labs reviewed  -Aspirin none due to ITP and [place] [address]  -NS 250mL IV bolus pre-cath ordered   -procedure discussed with patient; risks and benefits explained, questions answered  -consent obtained by attending IC    Risks, benefits, and alternatives reviewed.  Risks including but not limited to MI, death, stroke, bleeding, infection, vessel injury, hematoma, renal failure, allergic reaction, urgent open heart surgery, restenosis and stent thrombosis were reviewed.  All questions answered.  Patient is agreeable to proceed.

## 2024-11-07 NOTE — DISCHARGE NOTE PROVIDER - NSDCCPCAREPLAN_GEN_ALL_CORE_FT
PRINCIPAL DISCHARGE DIAGNOSIS  Diagnosis: PAD (peripheral artery disease)  Assessment and Plan of Treatment: No heavy lifting, driving, sex, tub baths, swimming, or any activity that submerges the lower half of the body in water for 48 hours.  Limited walking and stairs for 48 hours.    Change the bandaid after 24 hours and every 24 hours after that.  Keep the puncture site dry and covered with a bandaid until a scab forms.    Observe the site frequently.  If bleeding or a large lump (the size of a golf ball or bigger) occurs lie flat, apply continuous direct pressure just above the puncture site for at least 10 minutes, and notify your physician immediately.  If the bleeding cannot be controlled, call 911 immediately for assistance.  Notify your physician of pain, swelling or any drainage.    Notify your physician immediately if coldness, numbness, discoloration or pain in your foot occurs.

## 2024-11-07 NOTE — DISCHARGE NOTE PROVIDER - NSDCPNSUBOBJ_GEN_ALL_CORE
Post peripheral angiogram with Dr Felton  No significant disease of LSFA  access site stable, no bleed/hematoma, distal pulse +,     Intraprocedural findings:  Presentation:   69 years old male.      Diagnostic Conclusions:     Patent mildly diseased common and external iliac arteries    Left: Moderate disease of distal SFA (40-50% stenosis). Patent AT.  moderate/severe stenosis of proximal PT. occluded  peroneal    Right: Patent mid/distal SFA stent    Recommendations:     Cotinue medicalmanagement of claudication.      Acute complication:    No complications       Plan:  -Formal cath report pending  -Post procedure management/monitoring per protocol  -Access site precautions  -R FA site benign   -Bedrest x1  hours post procedure  - DC home   -Continue current medical therapy

## 2024-11-07 NOTE — DISCHARGE NOTE NURSING/CASE MANAGEMENT/SOCIAL WORK - PATIENT PORTAL LINK FT
You can access the FollowMyHealth Patient Portal offered by [facility] by registering at the following website: [email or web address]. By joining [employer]’s FollowMyHealth portal, you will also be able to view your health information using other applications (apps) compatible with our system.

## 2024-11-07 NOTE — DISCHARGE NOTE PROVIDER - NSDCCPTREATMENT_GEN_ALL_CORE_FT
PRINCIPAL PROCEDURE  Procedure: Angioplasty, artery, peripheral  Findings and Treatment: No heavy lifting, driving, sex, tub baths, swimming, or any activity that submerges the lower half of the body in water for 48 hours.  Limited walking and stairs for 48 hours.    Change the bandaid after 24 hours and every 24 hours after that.  Keep the puncture site dry and covered with a bandaid until a scab forms.    Observe the site frequently.  If bleeding or a large lump (the size of a golf ball or bigger) occurs lie flat, apply continuous direct pressure just above the puncture site for at least 10 minutes, and notify your physician immediately.  If the bleeding cannot be controlled, call 911 immediately for assistance.  Notify your physician of pain, swelling or any drainage.    Notify your physician immediately if coldness, numbness, discoloration or pain in your foot occurs.

## 2024-11-07 NOTE — DISCHARGE NOTE PROVIDER - NSDCMRMEDTOKEN_GEN_ALL_CORE_FT
albuterol 90 mcg/inh inhalation aerosol: 2 puff(s) inhaled every 6 hours  clopidogrel 75 mg oral tablet: 1 tab(s) orally once a day  Combivent Respimat 20 mcg-100 mcg/inh inhalation aerosol: inhaled 2 times a day  Crestor 10 mg oral tablet: 1 tab(s) orally once a day (at bedtime)  cyclobenzaprine 5 mg oral tablet: 2 tab(s) orally once a day (at bedtime)  famotidine 40 mg oral tablet: 1 tab(s) orally once a day  montelukast 10 mg oral tablet: 1 tab(s) orally once a day  Multiple Vitamins oral tablet: 1 tab(s) orally once a day  naproxen 250 mg oral tablet: 2 tab(s) orally once a day

## 2024-11-07 NOTE — DISCHARGE NOTE NURSING/CASE MANAGEMENT/SOCIAL WORK - FINANCIAL ASSISTANCE
[facility] provides services at a reduced cost to those who are determined to be eligible through [facility]’s financial assistance program. Information regarding [facility]’s financial assistance program can be found by going to [email or web address] or by calling [phone].

## 2024-11-07 NOTE — DISCHARGE NOTE PROVIDER - HOSPITAL COURSE
Procedures Performed   Procedures:                 1.    Ultrasound Guided Access   2.    Arterial Access - Right Femoral   3.    Descending Aorta Angiography   4.    Lower Extremity Angiography with DSA Stepping   5.    M[employer]     Procedure Type:          [id number]   Indications:               claudication   Lab Visit Indication:      PAD     Presentation:   69 years old male.      Diagnostic Conclusions:     Patent mildly diseased common and external iliac arteries    Left: Moderate disease of distal SFA (40-50% stenosis). Patent AT.  moderate/severe stenosis of proximal PT. occluded  peroneal    Right: Patent mid/distal SFA stent    Recommendations:     Cotin medicalmanagement of claudication.      Acute complication:    No complications

## 2024-11-07 NOTE — DISCHARGE NOTE PROVIDER - NSDCFUSCHEDAPPT_GEN_ALL_CORE_FT
Sebastian Felton  [facility] Physician [facility]  CARDIOLOGY [address]  Scheduled Appointment: 12/04/2024    Yoon Mack  [facility] Physician [facility]  [facility]  Scheduled Appointment: 01/16/2025

## 2024-11-07 NOTE — DISCHARGE NOTE PROVIDER - CARE PROVIDER_API CALL
Sebastian Felton  Interventional Cardiology  [address] [place] [id number]  Phone: [phone]  Fax: [phone]  Follow Up Time:

## 2024-11-14 PROBLEM — K21.9 GASTRO-ESOPHAGEAL REFLUX DISEASE WITHOUT ESOPHAGITIS: Chronic | Status: ACTIVE | Noted: 2024-10-31

## 2024-11-14 PROBLEM — I73.9 PERIPHERAL VASCULAR DISEASE, UNSPECIFIED: Chronic | Status: ACTIVE | Noted: 2024-10-31

## 2024-11-19 ENCOUNTER — APPOINTMENT (OUTPATIENT)
Dept: ORTHOPEDIC SURGERY | Facility: CLINIC | Age: 69
End: 2024-11-19
Payer: MEDICARE

## 2024-11-19 PROCEDURE — G2211 COMPLEX E/M VISIT ADD ON: CPT

## 2024-11-19 PROCEDURE — 73564 X-RAY EXAM KNEE 4 OR MORE: CPT | Mod: RT

## 2024-11-19 PROCEDURE — 99203 OFFICE O/P NEW LOW 30 MIN: CPT

## 2024-11-19 RX ORDER — CEFADROXIL 500 MG/1
500 CAPSULE ORAL TWICE DAILY
Qty: 20 | Refills: 0 | Status: ACTIVE | COMMUNITY
Start: 2024-11-19 | End: 1900-01-01

## 2024-11-20 ENCOUNTER — APPOINTMENT (OUTPATIENT)
Dept: ORTHOPEDIC SURGERY | Facility: CLINIC | Age: 69
End: 2024-11-20
Payer: MEDICARE

## 2024-11-20 VITALS
HEIGHT: 72 IN | HEART RATE: 106 BPM | SYSTOLIC BLOOD PRESSURE: 126 MMHG | BODY MASS INDEX: 17.2 KG/M2 | DIASTOLIC BLOOD PRESSURE: 86 MMHG | WEIGHT: 127 LBS

## 2024-11-20 DIAGNOSIS — T84.84XA PAIN DUE TO INTERNAL ORTHOPEDIC PROSTHETIC DEVICES, IMPLANTS AND GRAFTS, INITIAL ENCOUNTER: ICD-10-CM

## 2024-11-20 DIAGNOSIS — M25.561 PAIN IN RIGHT KNEE: ICD-10-CM

## 2024-11-20 PROCEDURE — 99214 OFFICE O/P EST MOD 30 MIN: CPT

## 2024-12-04 ENCOUNTER — NON-APPOINTMENT (OUTPATIENT)
Age: 69
End: 2024-12-04

## 2024-12-04 ENCOUNTER — OUTPATIENT (OUTPATIENT)
Dept: OUTPATIENT SERVICES | Facility: HOSPITAL | Age: 69
LOS: 1 days | End: 2024-12-04
Payer: MEDICARE

## 2024-12-04 ENCOUNTER — APPOINTMENT (OUTPATIENT)
Dept: CARDIOLOGY | Facility: CLINIC | Age: 69
End: 2024-12-04
Payer: MEDICARE

## 2024-12-04 VITALS
OXYGEN SATURATION: 96 % | BODY MASS INDEX: 17.34 KG/M2 | HEIGHT: 72 IN | SYSTOLIC BLOOD PRESSURE: 122 MMHG | DIASTOLIC BLOOD PRESSURE: 70 MMHG | WEIGHT: 128 LBS | HEART RATE: 91 BPM

## 2024-12-04 VITALS
DIASTOLIC BLOOD PRESSURE: 80 MMHG | RESPIRATION RATE: 18 BRPM | HEART RATE: 78 BPM | HEIGHT: 72 IN | WEIGHT: 126.32 LBS | SYSTOLIC BLOOD PRESSURE: 110 MMHG | TEMPERATURE: 97 F | OXYGEN SATURATION: 96 %

## 2024-12-04 DIAGNOSIS — Z01.818 ENCOUNTER FOR OTHER PREPROCEDURAL EXAMINATION: ICD-10-CM

## 2024-12-04 DIAGNOSIS — Z95.820 PERIPHERAL VASCULAR ANGIOPLASTY STATUS WITH IMPLANTS AND GRAFTS: Chronic | ICD-10-CM

## 2024-12-04 DIAGNOSIS — D69.6 THROMBOCYTOPENIA, UNSPECIFIED: ICD-10-CM

## 2024-12-04 DIAGNOSIS — Z98.890 OTHER SPECIFIED POSTPROCEDURAL STATES: Chronic | ICD-10-CM

## 2024-12-04 DIAGNOSIS — Z29.9 ENCOUNTER FOR PROPHYLACTIC MEASURES, UNSPECIFIED: ICD-10-CM

## 2024-12-04 DIAGNOSIS — T84.84XA PAIN DUE TO INTERNAL ORTHOPEDIC PROSTHETIC DEVICES, IMPLANTS AND GRAFTS, INITIAL ENCOUNTER: ICD-10-CM

## 2024-12-04 DIAGNOSIS — J44.9 CHRONIC OBSTRUCTIVE PULMONARY DISEASE, UNSPECIFIED: ICD-10-CM

## 2024-12-04 DIAGNOSIS — R00.0 TACHYCARDIA, UNSPECIFIED: ICD-10-CM

## 2024-12-04 DIAGNOSIS — I73.9 PERIPHERAL VASCULAR DISEASE, UNSPECIFIED: ICD-10-CM

## 2024-12-04 DIAGNOSIS — R06.09 OTHER FORMS OF DYSPNEA: ICD-10-CM

## 2024-12-04 DIAGNOSIS — R73.03 PREDIABETES: ICD-10-CM

## 2024-12-04 DIAGNOSIS — Z78.9 OTHER SPECIFIED HEALTH STATUS: ICD-10-CM

## 2024-12-04 DIAGNOSIS — Z13.89 ENCOUNTER FOR SCREENING FOR OTHER DISORDER: ICD-10-CM

## 2024-12-04 DIAGNOSIS — E78.5 HYPERLIPIDEMIA, UNSPECIFIED: ICD-10-CM

## 2024-12-04 LAB
A1C WITH ESTIMATED AVERAGE GLUCOSE RESULT: 5.4 % — SIGNIFICANT CHANGE UP (ref 4–5.6)
ANION GAP SERPL CALC-SCNC: 13 MMOL/L — SIGNIFICANT CHANGE UP (ref 5–17)
APTT BLD: 36.2 SEC — HIGH (ref 24.5–35.6)
BASOPHILS # BLD AUTO: 0.04 K/UL — SIGNIFICANT CHANGE UP (ref 0–0.2)
BASOPHILS NFR BLD AUTO: 0.6 % — SIGNIFICANT CHANGE UP (ref 0–2)
BLD GP AB SCN SERPL QL: SIGNIFICANT CHANGE UP
BUN SERPL-MCNC: 11.5 MG/DL — SIGNIFICANT CHANGE UP (ref 8–20)
CALCIUM SERPL-MCNC: 9.8 MG/DL — SIGNIFICANT CHANGE UP (ref 8.4–10.5)
CHLORIDE SERPL-SCNC: 104 MMOL/L — SIGNIFICANT CHANGE UP (ref 96–108)
CO2 SERPL-SCNC: 26 MMOL/L — SIGNIFICANT CHANGE UP (ref 22–29)
CREAT SERPL-MCNC: 1.05 MG/DL — SIGNIFICANT CHANGE UP (ref 0.5–1.3)
EGFR: 78 ML/MIN/1.73M2 — SIGNIFICANT CHANGE UP
EOSINOPHIL # BLD AUTO: 0.09 K/UL — SIGNIFICANT CHANGE UP (ref 0–0.5)
EOSINOPHIL NFR BLD AUTO: 1.4 % — SIGNIFICANT CHANGE UP (ref 0–6)
ESTIMATED AVERAGE GLUCOSE: 108 MG/DL — SIGNIFICANT CHANGE UP (ref 68–114)
GLUCOSE SERPL-MCNC: 113 MG/DL — HIGH (ref 70–99)
HCT VFR BLD CALC: 43.6 % — SIGNIFICANT CHANGE UP (ref 39–50)
HGB BLD-MCNC: 14.9 G/DL — SIGNIFICANT CHANGE UP (ref 13–17)
IMM GRANULOCYTES NFR BLD AUTO: 0.2 % — SIGNIFICANT CHANGE UP (ref 0–0.9)
INR BLD: 0.99 RATIO — SIGNIFICANT CHANGE UP (ref 0.85–1.16)
LYMPHOCYTES # BLD AUTO: 2.25 K/UL — SIGNIFICANT CHANGE UP (ref 1–3.3)
LYMPHOCYTES # BLD AUTO: 34.3 % — SIGNIFICANT CHANGE UP (ref 13–44)
MCHC RBC-ENTMCNC: 31.2 PG — SIGNIFICANT CHANGE UP (ref 27–34)
MCHC RBC-ENTMCNC: 34.2 G/DL — SIGNIFICANT CHANGE UP (ref 32–36)
MCV RBC AUTO: 91.4 FL — SIGNIFICANT CHANGE UP (ref 80–100)
MONOCYTES # BLD AUTO: 0.46 K/UL — SIGNIFICANT CHANGE UP (ref 0–0.9)
MONOCYTES NFR BLD AUTO: 7 % — SIGNIFICANT CHANGE UP (ref 2–14)
MRSA PCR RESULT.: SIGNIFICANT CHANGE UP
NEUTROPHILS # BLD AUTO: 3.71 K/UL — SIGNIFICANT CHANGE UP (ref 1.8–7.4)
NEUTROPHILS NFR BLD AUTO: 56.5 % — SIGNIFICANT CHANGE UP (ref 43–77)
PLATELET # BLD AUTO: 129 K/UL — LOW (ref 150–400)
POTASSIUM SERPL-MCNC: 4.9 MMOL/L — SIGNIFICANT CHANGE UP (ref 3.5–5.3)
POTASSIUM SERPL-SCNC: 4.9 MMOL/L — SIGNIFICANT CHANGE UP (ref 3.5–5.3)
PROTHROM AB SERPL-ACNC: 11.2 SEC — SIGNIFICANT CHANGE UP (ref 9.9–13.4)
RBC # BLD: 4.77 M/UL — SIGNIFICANT CHANGE UP (ref 4.2–5.8)
RBC # FLD: 14.4 % — SIGNIFICANT CHANGE UP (ref 10.3–14.5)
S AUREUS DNA NOSE QL NAA+PROBE: SIGNIFICANT CHANGE UP
SODIUM SERPL-SCNC: 143 MMOL/L — SIGNIFICANT CHANGE UP (ref 135–145)
WBC # BLD: 6.56 K/UL — SIGNIFICANT CHANGE UP (ref 3.8–10.5)
WBC # FLD AUTO: 6.56 K/UL — SIGNIFICANT CHANGE UP (ref 3.8–10.5)

## 2024-12-04 PROCEDURE — 86850 RBC ANTIBODY SCREEN: CPT

## 2024-12-04 PROCEDURE — 99214 OFFICE O/P EST MOD 30 MIN: CPT | Mod: 25

## 2024-12-04 PROCEDURE — 83036 HEMOGLOBIN GLYCOSYLATED A1C: CPT

## 2024-12-04 PROCEDURE — 85025 COMPLETE CBC W/AUTO DIFF WBC: CPT

## 2024-12-04 PROCEDURE — 93000 ELECTROCARDIOGRAM COMPLETE: CPT

## 2024-12-04 PROCEDURE — 86900 BLOOD TYPING SEROLOGIC ABO: CPT

## 2024-12-04 PROCEDURE — 36415 COLL VENOUS BLD VENIPUNCTURE: CPT

## 2024-12-04 PROCEDURE — 87641 MR-STAPH DNA AMP PROBE: CPT

## 2024-12-04 PROCEDURE — 87640 STAPH A DNA AMP PROBE: CPT

## 2024-12-04 PROCEDURE — 85730 THROMBOPLASTIN TIME PARTIAL: CPT

## 2024-12-04 PROCEDURE — 85610 PROTHROMBIN TIME: CPT

## 2024-12-04 PROCEDURE — 80048 BASIC METABOLIC PNL TOTAL CA: CPT

## 2024-12-04 PROCEDURE — 86901 BLOOD TYPING SEROLOGIC RH(D): CPT

## 2024-12-04 PROCEDURE — G0463: CPT

## 2024-12-04 NOTE — H&P PST ADULT - HISTORY OF PRESENT ILLNESS
70yo M patient of DR. Mccoy, referred by DR. Worthy, Pmhx current smoker, COPD/ sinus tachycardia, Hld/ PAD/ thrombocytopenia, Hem Dr. Burnett, presents for PST 12/04/2024 for planned orthopedic surgery. hx right knee skin breakdown s/p patella ORIF DOS about 14 years ago 2/2 right knee injury DOI02/27/2011, patient was prescribed antibiotic by Dr. Worthy and followed up with Dr. Mccoy. he has been having bleeding and drainage from his right knee for about one month, patient reports knee was initially puffy and sore, and he initially thought it was a case of "water on the knee". patient is described as... worsens with bending, lifting, WB, relieved with rest. reports having discussed clinical concerns with Dr. Mccoy, and reportedly pursuing surgical intervention with Dr. Mccoy as discussed for mgmt potential infection of prior hardware placed. he is scheduled 12/17/2024 for removal deep implants of right ankle with Dr. Mccoy.  70yo M patient of DR. Mccoy, referred by DR. Worthy, Pmhx prediabetes/ current smoker, COPD/ sinus tachycardia, hx MI 2018/ Hld/ DVT RLE 2022, PAD, s/p RLE stent SFA [id number] on Plavix, sees / thrombocytopenia, Hem Dr. Burnett/ depression/ right knee complication of prior hardware, presents for PST 12/04/2024 for planned orthopedic surgery. hx right knee skin breakdown s/p patella ORIF DOS about 14 years ago 2/2 right knee injury DOI02/27/2011, patient was prescribed antibiotic by Dr. Worthy which he reportedly completed, and followed up with Dr. Mccoy. he has been having bleeding and drainage from his right knee for >1month, patient reports knee was initially puffy and sore, and he initially thought it was a case of "water on the knee" but site started to drain, he was "nursing" it for about 1 month and then went for evaluation with ortho. pain is described as sharp 10/10, worsens with bending, lifting, driving, WB, relieved with rest. reports having discussed clinical concerns with Dr. Mccoy, and reportedly pursuing surgical intervention with Dr. Mccoy as discussed for mgmt complication of hardware/concerns. he is scheduled 12/17/2024 for removal deep implants of right knee with Dr. Mccoy.

## 2024-12-04 NOTE — H&P PST ADULT - ASSESSMENT
68yo M patient of DR. Mccoy, referred by DR. Worthy, Pmhx prediabetes/ current smoker, COPD/ sinus tachycardia, hx MI 2018/ Hld/ DVT RLE , PAD, s/p RLE stent SFA [id number] on Plavix, sees / thrombocytopenia, Hem Dr. Burnett/ depression/ right knee complication of prior hardware, presents for PST 2024 for planned orthopedic surgery. hx right knee skin breakdown s/p patella ORIF DOS about 14 years ago 2/2 right knee injury [id number], patient was prescribed antibiotic by Dr. Worthy which he reportedly completed, and followed up with Dr. Mccoy. he has been having bleeding and drainage from his right knee for >1month, patient reports knee was initially puffy and sore, and he initially thought it was a case of "water on the knee" but site started to drain, he was "nursing" it for about 1 month and then went for evaluation with ortho. pain is described as sharp 10/10, worsens with bending, lifting, driving, WB, relieved with rest. reports having discussed clinical concerns with Dr. Mccoy, and reportedly pursuing surgical intervention with Dr. Mccoy as discussed for mgmt complication of hardware/concerns. he is scheduled 2024 for removal deep implants of right knee with Dr. Mccoy.      CAPRINI SCORE    AGE RELATED RISK FACTORS                                                             [ ] Age 41-60 years                                            (1 Point)  [x ] Age: 61-74 years                                           (2 Points)                 [ ] Age= 75 years                                                (3 Points)             DISEASE RELATED RISK FACTORS                                                       [ ] Edema in the lower extremities                 (1 Point)                     [ ] Varicose veins                                               (1 Point)                                 [ ] BMI > 25 Kg/m2                                            (1 Point)                                  [ ] Serious infection (ie PNA)                            (1 Point)                     [ x] Lung disease ( COPD, Emphysema)            (1 Point)                                                                          [x ] Acute myocardial infarction                         (1 Point)                  [ ] Congestive heart failure (in the previous month)  (1 Point)         [ ] Inflammatory bowel disease                            (1 Point)                  [ ] Central venous access, PICC or Port               (2 points)       (within the last month)                                                                [ ] Stroke (in the previous month)                        (5 Points)    [ ] Previous or present malignancy                       (2 points)                                                                                                                                                         HEMATOLOGY RELATED FACTORS                                                         [x ] Prior episodes of VTE                                     (3 Points)                     [ ] Positive family history for VTE                      (3 Points)                  [ ] Prothrombin 19224 A                                     (3 Points)                     [ ] Factor V Leiden                                                (3 Points)                        [ ] Lupus anticoagulants                                      (3 Points)                                                           [ ] Anticardiolipin antibodies                              (3 Points)                                                       [ ] High homocysteine in the blood                   (3 Points)                                             [ ] Other congenital or acquired thrombophilia      (3 Points)                                                [ ] Heparin induced thrombocytopenia                  (3 Points)                                        MOBILITY RELATED FACTORS  [ ] Bed rest                                                         (1 Point)  [ ] Plaster cast                                                    (2 points)  [ ] Bed bound for more than 72 hours           (2 Points)    GENDER SPECIFIC FACTORS  [ ] Pregnancy or had a baby within the last month   (1 Point)  [ ] Post-partum < 6 weeks                                   (1 Point)  [ ] Hormonal therapy  or oral contraception   (1 Point)  [ ] History of pregnancy complications              (1 point)  [ ] Unexplained or recurrent              (1 Point)    OTHER RISK FACTORS                                           (1 Point)  [x ] BMI >40, smoking, diabetes requiring insulin, chemotherapy  blood transfusions and length of surgery over 2 hours    SURGERY RELATED RISK FACTORS  [ ]  Section within the last month     (1 Point)  [ ] Minor surgery                                                  (1 Point)  [ ] Arthroscopic surgery                                       (2 Points)  [x ] Planned major surgery lasting more            (2 Points)      than 45 minutes     [ ] Elective hip or knee joint replacement       (5 points)       surgery                                                TRAUMA RELATED RISK FACTORS  [ ] Fracture of the hip, pelvis, or leg                       (5 Points)  [ ] Spinal cord injury resulting in paralysis             (5 points)       (in the previous month)    [ ] Paralysis  (less than 1 month)                             (5 Points)  [ ] Multiple Trauma within 1 month                        (5 Points)    Total Score [      10  ]    Caprini Score 0-2: Low Risk, NO VTE prophylaxis required for most patients, encourage ambulation  Caprini Score 3-6: Moderate Risk , pharmacologic VTE prophylaxis is indicated for most patients (in the absence of contraindications)  Caprini Score Greater than or =7: High risk, pharmocologic VTE prophylaxis indicated for most patients (in the absence of contraindications)    OPIOID RISK TOOL    GLADYS EACH BOX THAT APPLIES AND ADD TOTALS AT THE END    FAMILY HISTORY OF SUBSTANCE ABUSE                 FEMALE         MALE                                                Alcohol                             [  ]1 pt          [  ]3pts                                               Illegal Durgs                     [  ]2 pts        [  ]3pts                                               Rx Drugs                           [  ]4 pts        [  ]4 pts    PERSONAL HISTORY OF SUBSTANCE ABUSE                                                                                          Alcohol                             [  ]3 pts       [  ]3 pts                                               Illegal Drugs                     [  ]4 pts        [  ]4 pts                                               Rx Drugs                           [  ]5 pts        [  ]5 pts    AGE BETWEEN 16-45 YEARS                                      [  ]1 pt         [  ]1 pt    HISTORY OF PREADOLESCENT   SEXUAL ABUSE                                                             [  ]3 pts        [  ]0pts    PSYCHOLOGICAL DISEASE                     ADD, OCD, Bipolar, Schizophrenia        [  ]2 pts         [  ]2 pts                      Depression                                               [  ]1 pt           [ x ]1 pt           SCORING TOTAL   (add numbers and type here)              (1)                                     A score of 3 or lower indicated LOW risk for future opioid abuse  A score of 4 to 7 indicated moderate risk for future opioid abuse  A score of 8 or higher indicates a high risk for opioid abuse

## 2024-12-04 NOTE — H&P PST ADULT - MUSCULOSKELETAL
details… right knee. wound present- dressing intact. denies joint line tenderness, pain/ redness at site of wound/decreased ROM due to pain/joint swelling/strength 5/5 bilateral upper extremities

## 2024-12-04 NOTE — H&P PST ADULT - NS MD HP SKIN WOUND STATUS
right knee, dressing intact, periwound area blanches, reports tenderness, patient c/w purulent drainage from site.

## 2024-12-04 NOTE — H&P PST ADULT - NSICDXFAMILYHX_GEN_ALL_CORE_FT
FAMILY HISTORY:  No pertinent family history     FAMILY HISTORY:  Mother  Still living? Unknown  FH: breast cancer, Age at diagnosis: Age Unknown    Aunt  Still living? Unknown  FH: lung cancer, Age at diagnosis: Age Unknown

## 2024-12-04 NOTE — H&P PST ADULT - PROBLEM SELECTOR PLAN 3
check cbc  chronic hx of- continued f/u with hem for surveillance, most recent hem note in patient chart  pending medical optimization

## 2024-12-04 NOTE — H&P PST ADULT - NSICDXPASTSURGICALHX_GEN_ALL_CORE_FT
PAST SURGICAL HISTORY:  History of right knee surgery      PAST SURGICAL HISTORY:  History of right knee surgery     S/P angiography     S/P angioplasty with stent

## 2024-12-04 NOTE — H&P PST ADULT - ATTENDING PHYSICIAN: I HAVE REVIEWED THE CLINICAL DOCUMENTATION AND AGREE WITH THE ABOVE NOTE
Screening questions completed 6/30    Scheduling Details  (Please ask for phone number if not scheduled by patient)      Caller : Cami  Date of Procedure: 9/29  Surgeon: Kyle  [place] [facility] [place]        Sedation Type: MODERATE l   Conscious Sedation- Needs  for 6 hours after the procedure  MAC/General-Needs  for 24 hours after procedure    n :[Pre-op Required] at [facility]  SH  MG and OR for MAC sedation   (advise patient they will need a pre-op WITH IN 30 DAYS of procedure date)     Type of Procedure Scheduled:   Lower Endoscopy [Colonoscopy]    Which Colonoscopy Prep was Sent?:   Gprep - mag citrate recall      [facility] PATIENTS & [facility] PATIENTS NEEDS EXTENDED PREP       Informed patient they will need an adult  y  Cannot take any type of public or medical transportation alone    Pre-Procedure Covid test to be completed at [facility] or Externally: home test  **INFORMED OF HOME TESTING & LAB OPTION**        Confirmed Nurse will call to complete assessment y    Additional comments: Per staff messages from Kyle and Melinda, to add to [facility] 9/29 at 1045AM.       
Statement Selected

## 2024-12-04 NOTE — H&P PST ADULT - PROBLEM SELECTOR PLAN 9
cap 10  High risk, SCDs ordered for day of procedure.  Surgical team to assess need for  pharmacological and mechanical measures for VTE prophylaxis

## 2024-12-04 NOTE — H&P PST ADULT - EKG AND INTERPRETATION
completed 12/04/2024 DR. Felton sinus tachycardia, frequent ectopic ventricular beats, voltage criteria for LVH, voltage criteria w/o ST/T, abnormality may be normal   copy in patient chart  cardiology optimization in patient chart

## 2024-12-04 NOTE — H&P PST ADULT - PROBLEM SELECTOR PLAN 1
scheduled 12/17/2024 for removal deep implants of right knee with Dr. Mccoy.  Patient educated on needed optimizations, surgical scrub, ERAS,  labs/diagnostics performed, preadmission instructions,  and day of procedure medications- verbalized understanding, teach back method utilized.   ERP protocol reviewed with patient, MSSA/MRSA swabbed in PST, results pending   Stop vitamins/ supplements/ NSAIDs 5 days prior to procedure.   advised on medication use as per periop protocol (see written forms)  pending medical optimization  cardiology optimization in chart with recommendations for anesthesia to use regional if possible, this is noted on PST flow sheet, periop services form.

## 2024-12-04 NOTE — H&P PST ADULT - PROBLEM SELECTOR PLAN 2
advised on medication use as per periop protocol (see written forms)   cardiology optimization in patient chart

## 2024-12-04 NOTE — H&P PST ADULT - BMI (KG/M2)
Scribe Attestation (For Scribes USE Only)... 17.1 Scribe Attestation (For Scribes USE Only).../Attending Attestation (For Attendings USE Only)...

## 2024-12-04 NOTE — H&P PST ADULT - NSICDXPASTMEDICALHX_GEN_ALL_CORE_FT
PAST MEDICAL HISTORY:  Back pain     COPD (chronic obstructive pulmonary disease)     GERD (gastroesophageal reflux disease)     Mixed hyperlipidemia     PAD (peripheral artery disease)     Pain due to internal orthopedic prosthetic device, initial encounter     Thrombocytopenia      PAST MEDICAL HISTORY:  Back pain     COPD (chronic obstructive pulmonary disease)     GERD (gastroesophageal reflux disease)     History of MI (myocardial infarction)     Major depression     Mixed hyperlipidemia     PAD (peripheral artery disease)     Pain due to internal orthopedic prosthetic device, initial encounter     Prediabetes     Sinus tachycardia     Thrombocytopenia

## 2024-12-12 PROBLEM — T84.84XA PAIN DUE TO INTERNAL ORTHOPEDIC PROSTHETIC DEVICES, IMPLANTS AND GRAFTS, INITIAL ENCOUNTER: Chronic | Status: ACTIVE | Noted: 2024-12-04

## 2024-12-12 PROBLEM — I25.2 OLD MYOCARDIAL INFARCTION: Chronic | Status: ACTIVE | Noted: 2024-12-04

## 2024-12-12 PROBLEM — R00.0 TACHYCARDIA, UNSPECIFIED: Chronic | Status: ACTIVE | Noted: 2024-12-04

## 2024-12-12 PROBLEM — R73.03 PREDIABETES: Chronic | Status: ACTIVE | Noted: 2024-12-04

## 2024-12-12 PROBLEM — F32.9 MAJOR DEPRESSIVE DISORDER, SINGLE EPISODE, UNSPECIFIED: Chronic | Status: ACTIVE | Noted: 2024-12-04

## 2024-12-16 ENCOUNTER — RX RENEWAL (OUTPATIENT)
Age: 69
End: 2024-12-16

## 2024-12-16 NOTE — ASU PATIENT PROFILE, ADULT - NSICDXPASTMEDICALHX_GEN_ALL_CORE_FT
PAST MEDICAL HISTORY:  Back pain     COPD (chronic obstructive pulmonary disease)     GERD (gastroesophageal reflux disease)     History of MI (myocardial infarction)     Major depression     Mixed hyperlipidemia     PAD (peripheral artery disease)     Pain due to internal orthopedic prosthetic device, initial encounter     Prediabetes     Sinus tachycardia     Thrombocytopenia

## 2024-12-16 NOTE — ASU PATIENT PROFILE, ADULT - NSICDXPASTSURGICALHX_GEN_ALL_CORE_FT
PAST SURGICAL HISTORY:  History of right knee surgery     S/P angiography     S/P angioplasty with stent

## 2024-12-17 ENCOUNTER — OUTPATIENT (OUTPATIENT)
Dept: INPATIENT UNIT | Facility: HOSPITAL | Age: 69
LOS: 1 days | End: 2024-12-17
Payer: MEDICARE

## 2024-12-17 ENCOUNTER — APPOINTMENT (OUTPATIENT)
Dept: ORTHOPEDIC SURGERY | Facility: HOSPITAL | Age: 69
End: 2024-12-17

## 2024-12-17 ENCOUNTER — TRANSCRIPTION ENCOUNTER (OUTPATIENT)
Age: 69
End: 2024-12-17

## 2024-12-17 VITALS
SYSTOLIC BLOOD PRESSURE: 112 MMHG | TEMPERATURE: 98 F | HEART RATE: 74 BPM | HEIGHT: 72 IN | OXYGEN SATURATION: 99 % | DIASTOLIC BLOOD PRESSURE: 76 MMHG | RESPIRATION RATE: 16 BRPM | WEIGHT: 126.32 LBS

## 2024-12-17 VITALS
OXYGEN SATURATION: 97 % | RESPIRATION RATE: 16 BRPM | SYSTOLIC BLOOD PRESSURE: 142 MMHG | HEART RATE: 70 BPM | TEMPERATURE: 97 F | DIASTOLIC BLOOD PRESSURE: 82 MMHG

## 2024-12-17 DIAGNOSIS — Z98.890 OTHER SPECIFIED POSTPROCEDURAL STATES: Chronic | ICD-10-CM

## 2024-12-17 DIAGNOSIS — T84.84XA PAIN DUE TO INTERNAL ORTHOPEDIC PROSTHETIC DEVICES, IMPLANTS AND GRAFTS, INITIAL ENCOUNTER: ICD-10-CM

## 2024-12-17 DIAGNOSIS — Z95.820 PERIPHERAL VASCULAR ANGIOPLASTY STATUS WITH IMPLANTS AND GRAFTS: Chronic | ICD-10-CM

## 2024-12-17 LAB — APTT BLD: 37.9 SEC — HIGH (ref 24.5–35.6)

## 2024-12-17 PROCEDURE — 27350 REMOVAL OF KNEECAP: CPT | Mod: RT

## 2024-12-17 PROCEDURE — 87075 CULTR BACTERIA EXCEPT BLOOD: CPT

## 2024-12-17 PROCEDURE — 87070 CULTURE OTHR SPECIMN AEROBIC: CPT

## 2024-12-17 PROCEDURE — 87186 SC STD MICRODIL/AGAR DIL: CPT

## 2024-12-17 PROCEDURE — 73560 X-RAY EXAM OF KNEE 1 OR 2: CPT

## 2024-12-17 PROCEDURE — 20680 REMOVAL OF IMPLANT DEEP: CPT

## 2024-12-17 PROCEDURE — 85730 THROMBOPLASTIN TIME PARTIAL: CPT

## 2024-12-17 PROCEDURE — C9399: CPT

## 2024-12-17 PROCEDURE — 73560 X-RAY EXAM OF KNEE 1 OR 2: CPT | Mod: 26,RT

## 2024-12-17 PROCEDURE — 36415 COLL VENOUS BLD VENIPUNCTURE: CPT

## 2024-12-17 PROCEDURE — 87077 CULTURE AEROBIC IDENTIFY: CPT

## 2024-12-17 RX ORDER — OXYCODONE HYDROCHLORIDE 30 MG/1
1 TABLET ORAL
Qty: 28 | Refills: 0
Start: 2024-12-17

## 2024-12-17 RX ORDER — 0.9 % SODIUM CHLORIDE 0.9 %
1000 INTRAVENOUS SOLUTION INTRAVENOUS
Refills: 0 | Status: DISCONTINUED | OUTPATIENT
Start: 2024-12-17 | End: 2024-12-17

## 2024-12-17 RX ORDER — ONDANSETRON HYDROCHLORIDE 4 MG/1
4 TABLET, FILM COATED ORAL ONCE
Refills: 0 | Status: DISCONTINUED | OUTPATIENT
Start: 2024-12-17 | End: 2024-12-17

## 2024-12-17 RX ORDER — ONDANSETRON HYDROCHLORIDE 4 MG/1
4 TABLET, FILM COATED ORAL ONCE
Refills: 0 | Status: DISCONTINUED | OUTPATIENT
Start: 2024-12-17 | End: 2024-12-31

## 2024-12-17 RX ORDER — HYDROMORPHONE HYDROCHLORIDE 2 MG/1
0.5 TABLET ORAL
Refills: 0 | Status: DISCONTINUED | OUTPATIENT
Start: 2024-12-17 | End: 2024-12-17

## 2024-12-17 RX ORDER — CEFAZOLIN SODIUM 10 G
2000 VIAL (EA) INJECTION ONCE
Refills: 0 | Status: DISCONTINUED | OUTPATIENT
Start: 2024-12-17 | End: 2024-12-17

## 2024-12-17 RX ORDER — FENTANYL 12 UG/H
50 PATCH, EXTENDED RELEASE TRANSDERMAL
Refills: 0 | Status: DISCONTINUED | OUTPATIENT
Start: 2024-12-17 | End: 2024-12-17

## 2024-12-17 RX ORDER — HYDRALAZINE HYDROCHLORIDE 10 MG/1
5 TABLET ORAL ONCE
Refills: 0 | Status: COMPLETED | OUTPATIENT
Start: 2024-12-17 | End: 2024-12-17

## 2024-12-17 RX ORDER — SENNOSIDES/DOCUSATE SODIUM 8.6MG-50MG
2 TABLET ORAL
Qty: 14 | Refills: 0
Start: 2024-12-17

## 2024-12-17 RX ORDER — NALOXONE HCL 0.4 MG/ML
1 AMPUL (ML) INJECTION
Qty: 1 | Refills: 0
Start: 2024-12-17

## 2024-12-17 RX ORDER — OXYCODONE HYDROCHLORIDE 30 MG/1
5 TABLET ORAL
Refills: 0 | Status: DISCONTINUED | OUTPATIENT
Start: 2024-12-17 | End: 2024-12-17

## 2024-12-17 RX ORDER — OXYCODONE HYDROCHLORIDE 30 MG/1
10 TABLET ORAL
Refills: 0 | Status: DISCONTINUED | OUTPATIENT
Start: 2024-12-17 | End: 2024-12-17

## 2024-12-17 RX ADMIN — HYDRALAZINE HYDROCHLORIDE 5 MILLIGRAM(S): 10 TABLET ORAL at 10:41

## 2024-12-17 RX ADMIN — HYDROMORPHONE HYDROCHLORIDE 0.5 MILLIGRAM(S): 2 TABLET ORAL at 10:19

## 2024-12-17 RX ADMIN — HYDROMORPHONE HYDROCHLORIDE 0.5 MILLIGRAM(S): 2 TABLET ORAL at 10:44

## 2024-12-17 NOTE — BRIEF OPERATIVE NOTE - NSEVIDNCEORABSCESS_GEN_ALL_CORE
1. Continue Paxil 30mg daily.  2. Continue lamictal 200mg nightly.  3. Continue amitriptyline 25mg nightly.   4. Consider therapy.  5. Complete labs from Dr. Babb.  6. Follow up in 3 months.   7. To ER or 911 if unsafe or suicidal.    8. Smoking cessation advised.   purulence

## 2024-12-17 NOTE — PHYSICAL THERAPY INITIAL EVALUATION ADULT - ADDITIONAL COMMENTS
Pt lives alone in a 2nd floor apartment, 25 steps to enter. Independent with all PTA, without devices.

## 2024-12-17 NOTE — BRIEF OPERATIVE NOTE - NSICDXBRIEFPROCEDURE_GEN_ALL_CORE_FT
PROCEDURES:  Open removal of internal fixation device from right knee joint 17-Dec-2024 09:43:02  Ej Mccoy

## 2024-12-17 NOTE — ASU DISCHARGE PLAN (ADULT/PEDIATRIC) - FINANCIAL ASSISTANCE
[facility] provides services at a reduced cost to those who are determined to be eligible through [facility]’s financial assistance program. Information regarding [facility]’s financial assistance program can be found by going to [email or web address] or by calling [phone].

## 2024-12-17 NOTE — BRIEF OPERATIVE NOTE - COMMENTS
post-op plan: WBAT, PT/OT, ancef per protocol, contralateral SCD, asa (or equivalent) x 4 weeks post-op, preveena vac for 5-7 days

## 2024-12-17 NOTE — PHYSICAL THERAPY INITIAL EVALUATION ADULT - STANDING BALANCE: STATIC
Caller: JERMAIN    Relationship: CUSTOMER REPRESENTATIVE NO[employer]I/ [facility]    Best call back number: [phone]    What was the call regarding: JERMAIN CALLED STATING PT CONTACTED HER VERY CONFUSED, REQUESTING THEY SUBMIT VIMPAT PRIOR AUTHORIZATION. JERMAIN INFORMED PT THAT THEY ARE THE  OF PRIOR AUTHORIZATIONS AND AUTH WOULD HAVE TO BE SUBMITTED BY OFFICE.  PT THEN INFORMED JERMAIN THAT OFFICE HAD ALREADY SUBMITTED PA.     JERMAIN CALLED TO NOTIFY OFFICE THAT THEY HAVE NOT RECEIVED PRIOR AUTH. I ADVISED PT IS MISINFORMED AND THAT OUR CLINICAL STAFF HAS NOT YET INITIATED PRIOR AUTH.    JERMAIN STRESSES THAT PT IS COMPLETELY OUT OF VIMPAT AT THIS TIME.    Do you require a callback: ONLY IF NEEDED.    PLEASE REVIEW AND ADVISE.   normal balance

## 2024-12-17 NOTE — ASU PREOP CHECKLIST - NS ASU TO WHOM HOLDING TO OR
Juan Paez MD     
July 28, Juan Paez MD   
_______________________________ Daja Zacarias RN

## 2024-12-17 NOTE — ASU DISCHARGE PLAN (ADULT/PEDIATRIC) - CARE PROVIDER_API CALL
Ej Mccoy  Orthopaedic Trauma  [address] [place] [address]  Phone: [phone]  Fax: [phone]  Follow Up Time:

## 2024-12-17 NOTE — ASU DISCHARGE PLAN (ADULT/PEDIATRIC) - ASU DC SPECIAL INSTRUCTIONSFT
Keep dressing clean and dry. Remove dressing in one week. Do not shower for 3 days. Leave dressing when showering, remove dressing after showering, dry area and apply a clean new dressing. You are weight bearing as tolerated. Pain medication was sent to your pharmacy. Call Dr. Leyva office if you need a refill. You will take Aspirin 81 mg twice a day for 4 weeks for blood clot prevention. You will take Bactrim twice a day for 10 days for infection prevention. CALL DR. LEYVA OFFICE TO SCHEDULE A FOLLOW UP APPOINTMENT IN 2 WEEKS.

## 2024-12-19 ENCOUNTER — TRANSCRIPTION ENCOUNTER (OUTPATIENT)
Age: 69
End: 2024-12-19

## 2024-12-19 ENCOUNTER — OUTPATIENT (OUTPATIENT)
Dept: OUTPATIENT SERVICES | Facility: HOSPITAL | Age: 69
LOS: 1 days | Discharge: HOME | End: 2024-12-19
Payer: MEDICARE

## 2024-12-19 VITALS
SYSTOLIC BLOOD PRESSURE: 133 MMHG | TEMPERATURE: 98 F | DIASTOLIC BLOOD PRESSURE: 92 MMHG | WEIGHT: 126.99 LBS | RESPIRATION RATE: 16 BRPM | OXYGEN SATURATION: 99 % | HEART RATE: 110 BPM | HEIGHT: 72 IN

## 2024-12-19 VITALS
DIASTOLIC BLOOD PRESSURE: 103 MMHG | RESPIRATION RATE: 20 BRPM | OXYGEN SATURATION: 99 % | SYSTOLIC BLOOD PRESSURE: 144 MMHG | HEART RATE: 81 BPM

## 2024-12-19 DIAGNOSIS — R06.02 SHORTNESS OF BREATH: ICD-10-CM

## 2024-12-19 DIAGNOSIS — Z98.890 OTHER SPECIFIED POSTPROCEDURAL STATES: Chronic | ICD-10-CM

## 2024-12-19 DIAGNOSIS — Z95.820 PERIPHERAL VASCULAR ANGIOPLASTY STATUS WITH IMPLANTS AND GRAFTS: Chronic | ICD-10-CM

## 2024-12-19 DIAGNOSIS — R06.09 OTHER FORMS OF DYSPNEA: ICD-10-CM

## 2024-12-19 LAB
-  CLINDAMYCIN: SIGNIFICANT CHANGE UP
-  CLINDAMYCIN: SIGNIFICANT CHANGE UP
-  ERYTHROMYCIN: SIGNIFICANT CHANGE UP
-  ERYTHROMYCIN: SIGNIFICANT CHANGE UP
-  GENTAMICIN: SIGNIFICANT CHANGE UP
-  GENTAMICIN: SIGNIFICANT CHANGE UP
-  OXACILLIN: SIGNIFICANT CHANGE UP
-  OXACILLIN: SIGNIFICANT CHANGE UP
-  RIFAMPIN: SIGNIFICANT CHANGE UP
-  RIFAMPIN: SIGNIFICANT CHANGE UP
-  TETRACYCLINE: SIGNIFICANT CHANGE UP
-  TETRACYCLINE: SIGNIFICANT CHANGE UP
-  TRIMETHOPRIM/SULFAMETHOXAZOLE: SIGNIFICANT CHANGE UP
-  TRIMETHOPRIM/SULFAMETHOXAZOLE: SIGNIFICANT CHANGE UP
-  VANCOMYCIN: SIGNIFICANT CHANGE UP
-  VANCOMYCIN: SIGNIFICANT CHANGE UP
ANION GAP SERPL CALC-SCNC: 16 MMOL/L — SIGNIFICANT CHANGE UP (ref 5–17)
BUN SERPL-MCNC: 15.2 MG/DL — SIGNIFICANT CHANGE UP (ref 8–20)
CALCIUM SERPL-MCNC: 9.3 MG/DL — SIGNIFICANT CHANGE UP (ref 8.4–10.5)
CHLORIDE SERPL-SCNC: 102 MMOL/L — SIGNIFICANT CHANGE UP (ref 96–108)
CO2 SERPL-SCNC: 22 MMOL/L — SIGNIFICANT CHANGE UP (ref 22–29)
CREAT SERPL-MCNC: 1.27 MG/DL — SIGNIFICANT CHANGE UP (ref 0.5–1.3)
EGFR: 62 ML/MIN/1.73M2 — SIGNIFICANT CHANGE UP
GLUCOSE SERPL-MCNC: 117 MG/DL — HIGH (ref 70–99)
HCT VFR BLD CALC: 42.1 % — SIGNIFICANT CHANGE UP (ref 39–50)
HGB BLD-MCNC: 14.2 G/DL — SIGNIFICANT CHANGE UP (ref 13–17)
MAGNESIUM SERPL-MCNC: 1.8 MG/DL — SIGNIFICANT CHANGE UP (ref 1.6–2.6)
MCHC RBC-ENTMCNC: 31 PG — SIGNIFICANT CHANGE UP (ref 27–34)
MCHC RBC-ENTMCNC: 33.7 G/DL — SIGNIFICANT CHANGE UP (ref 32–36)
MCV RBC AUTO: 91.9 FL — SIGNIFICANT CHANGE UP (ref 80–100)
METHOD TYPE: SIGNIFICANT CHANGE UP
METHOD TYPE: SIGNIFICANT CHANGE UP
PLATELET # BLD AUTO: 104 K/UL — LOW (ref 150–400)
POTASSIUM SERPL-MCNC: 4.9 MMOL/L — SIGNIFICANT CHANGE UP (ref 3.5–5.3)
POTASSIUM SERPL-SCNC: 4.9 MMOL/L — SIGNIFICANT CHANGE UP (ref 3.5–5.3)
RBC # BLD: 4.58 M/UL — SIGNIFICANT CHANGE UP (ref 4.2–5.8)
RBC # FLD: 14.6 % — HIGH (ref 10.3–14.5)
SODIUM SERPL-SCNC: 139 MMOL/L — SIGNIFICANT CHANGE UP (ref 135–145)
WBC # BLD: 8.32 K/UL — SIGNIFICANT CHANGE UP (ref 3.8–10.5)
WBC # FLD AUTO: 8.32 K/UL — SIGNIFICANT CHANGE UP (ref 3.8–10.5)

## 2024-12-19 PROCEDURE — 80048 BASIC METABOLIC PNL TOTAL CA: CPT

## 2024-12-19 PROCEDURE — C1887: CPT

## 2024-12-19 PROCEDURE — C1894: CPT

## 2024-12-19 PROCEDURE — C1769: CPT

## 2024-12-19 PROCEDURE — 36415 COLL VENOUS BLD VENIPUNCTURE: CPT

## 2024-12-19 PROCEDURE — 99232 SBSQ HOSP IP/OBS MODERATE 35: CPT | Mod: 25

## 2024-12-19 PROCEDURE — 0523T NTRAPX C FFR W/3D FUNCJL MAP: CPT

## 2024-12-19 PROCEDURE — 93458 L HRT ARTERY/VENTRICLE ANGIO: CPT | Mod: 26

## 2024-12-19 PROCEDURE — 85027 COMPLETE CBC AUTOMATED: CPT

## 2024-12-19 PROCEDURE — 93458 L HRT ARTERY/VENTRICLE ANGIO: CPT

## 2024-12-19 PROCEDURE — 93005 ELECTROCARDIOGRAM TRACING: CPT

## 2024-12-19 PROCEDURE — 93010 ELECTROCARDIOGRAM REPORT: CPT

## 2024-12-19 PROCEDURE — C1760: CPT

## 2024-12-19 PROCEDURE — 83735 ASSAY OF MAGNESIUM: CPT

## 2024-12-19 PROCEDURE — 99152 MOD SED SAME PHYS/QHP 5/>YRS: CPT

## 2024-12-19 RX ORDER — NITROGLYCERIN 0.4MG/HR
1 PATCH, TRANSDERMAL 24 HOURS TRANSDERMAL
Refills: 0 | DISCHARGE

## 2024-12-19 RX ORDER — CHLORHEXIDINE GLUCONATE 1.2 MG/ML
1 RINSE ORAL ONCE
Refills: 0 | Status: ACTIVE | OUTPATIENT
Start: 2024-12-19

## 2024-12-19 RX ORDER — PANTOPRAZOLE SODIUM 40 MG/1
1 TABLET, DELAYED RELEASE ORAL
Refills: 0 | DISCHARGE

## 2024-12-19 RX ORDER — SODIUM CHLORIDE 9 MG/ML
250 INJECTION, SOLUTION INTRAMUSCULAR; INTRAVENOUS; SUBCUTANEOUS ONCE
Refills: 0 | Status: COMPLETED | OUTPATIENT
Start: 2024-12-19 | End: 2024-12-19

## 2024-12-19 RX ORDER — LACTULOSE 10 G/15ML
15 SOLUTION ORAL
Refills: 0 | DISCHARGE

## 2024-12-19 RX ADMIN — SODIUM CHLORIDE 250 MILLILITER(S): 9 INJECTION, SOLUTION INTRAMUSCULAR; INTRAVENOUS; SUBCUTANEOUS at 10:01

## 2024-12-19 RX ADMIN — Medication 25 GRAM(S): at 10:01

## 2024-12-19 NOTE — ASU PATIENT PROFILE, ADULT - FALL HARM RISK - HARM RISK INTERVENTIONS

## 2024-12-19 NOTE — CHART NOTE - NSCHARTNOTEFT_GEN_A_CORE
Interventional cardiology ACP note    Patient s/p LHC VIA RFA, patient's non compliant with his rt groin precautions, didn't follow 2 hours bedrest protocol starting sitting in bed after 1.5  hours post acth and pulled his IV out stating that " I am fine, I need to go home: A&&ox4, with full capacity  Examined the groin, benign, distal pulses 2+, NV status,  intact, Risks of not following groin  precautions explained to the patient and he remained non compliant.  Upon discharge, Offered patent  wheel chair for transport, He refused and ambulated out of unit in stable condition  Verbal and written discharge instructions provided to the pt

## 2024-12-19 NOTE — DISCHARGE NOTE PROVIDER - ATTENDING ATTESTATION STATEMENT
Pharmacy Consultation Note  (Antibiotic Dosing and Monitoring)    Initial consult date: 6/5/22  Consulting physician/provider: Renata Macdonald MD  Drug: Vancomycin  Indication: HAP    Age/  Gender Height Weight IBW  Allergy Information   58 y.o./female 4' 10\" (147.3 cm) 125 lb (56.7 kg)     Ideal body weight: 47.1 kg (103 lb 14.5 oz)  Adjusted ideal body weight: 51 kg (112 lb 5.5 oz)   Patient has no known allergies. Renal Function:  Recent Labs     06/03/22  0500 06/05/22  0155   BUN 13 17   CREATININE 0.5 0.6       Intake/Output Summary (Last 24 hours) at 6/5/2022 1358  Last data filed at 6/5/2022 0847  Gross per 24 hour   Intake 1336.03 ml   Output 250 ml   Net 1086.03 ml       Vancomycin Monitoring:  Trough:  No results for input(s): VANCOTROUGH in the last 72 hours. Random:  No results for input(s): VANCORANDOM in the last 72 hours. Vancomycin Administration Times:  Recent vancomycin administrations      No vancomycin IV orders with administrations found. Orders not given:          vancomycin (VANCOCIN) 1,250 mg in dextrose 5 % 250 mL IVPB    vancomycin (VANCOCIN) 1,000 mg in dextrose 5 % 250 mL IVPB ([id number])                Assessment:  · Patient is a 66 y.o. female who has been initiated on vancomycin  · Estimated Creatinine Clearance: 84 mL/min (based on SCr of 0.6 mg/dL).   · To dose vancomycin, pharmacy will be utilizing StepOne calculation software for goal AUC/SEBAS 400-600 mg/L-hr    Plan:  · Will initiate vancomycin 1250 mg IV x 1 followed by vancomycin 1000 mg every 12 hours  · Will check vancomycin levels when appropriate  · Will continue to monitor renal function   · Clinical pharmacy to follow      Rayo Torrez, PharmD  Pharmacy Resident  P: [id number]  6/5/2022 1:58 PM I have personally seen and examined the patient. I have collaborated with and supervised the

## 2024-12-19 NOTE — H&P PST ADULT - HISTORY OF PRESENT ILLNESS
68 y/o male with PMhx of PAD, s/p RLE angiography 1/20/2023 revealing right SFA , Balloon angioplasty , , and stent ) of the right SFA. Now has left leg severe lifestyle limitinghas been C/O chest pain Tpalpitations or dizziness. Denies SOB, orthopnea or PND. Denies palpitations or ankle swelling.  .  Underwent nuclear stress test in 3/4/2021 that revealed normal perfusion and normal EF.  Echocardiogram performed 1/14/2021 and 9/8/2023 showed an LVEF=52% no significant change compared to previous.   claudication despite walking therapy and medication with a recent left Duplex showing severe stenosis in his distal left SFA. Seen by Dr. Felton and will plan for catheterization and possible intervention.  Has been C/O difficulty swallowing since 6/2022, specially for solids, occasionally also for fluids. No weight loss. Will undergo upper endoscopy on 4/18/2023.  Has been under follow up with Hem/Onc for thrombocytopenia and elevated LFTs. Most likely ITP. Under follow up only. No treatment at the present time. He had upper endoscopy and colonoscopy. During the last colonoscopy he states that they found "polyps" and needs risk assessment for another colonoscopy. No records available from GI.  Hyperlipidemia not treated  Prediabetes with an IkW2a=6.7%  Thrombocytopenia under follow up with Hem/Onc (60K).  COPD treated with meds  Underwent Rt knee surgery in 2014  Smokes 5 cigs/day, smoked a pack/day until 2017  Doesnt drink alcohol  Denies the use of illicit drugs  No family history of heart disease  Received the COVID 19 vaccine and boosters.  ? 70 y/o male with PMhx of COPD, Hyperlipidemia, thrombocytopenia, PAD, s/p RLE angiography 1/20/2023 revealing right SFA , Balloon angioplasty , , and stent ) of the right SFA. Now has left leg severe lifestyle limiting,  recent left Duplex showing severe stenosis in his distal left SFA., Rt knee surgery in 2014  has been C/O chest pain   .Underwent nuclear stress test in 3/4/2021 that revealed normal perfusion and normal EF.  Echocardiogram performed 1/14/2021 and 9/8/2023 showed an LVEF=52% no significant change compared to previous.  Has been under follow up with Hem/Onc for thrombocytopenia and elevated LFTs. Most likely ITP. Under follow up only. No treatment at the present time. He had upper endoscopy for c/o dysphagia, and colonoscopy. During the last colonoscopy he states that they found "polyps"        HPI:     68 y/o male who is a current smoker, with PMhx of COPD, Hyperlipidemia, thrombocytopenia, PAD, s/p RLE angiography 1/20/2023 revealing right SFA , Balloon angioplasty , , and stent ) of the right SFA. Now has left leg severe lifestyle limiting,  recent left Duplex showing severe stenosis in his distal left SFA., Rt knee surgery in 2014  .Underwent nuclear stress test in 3/4/2021 that revealed normal perfusion and normal EF.  Echocardiogram performed 1/14/2021 and 9/8/2023 showed an LVEF=52% no significant change compared to previous.  Has been under follow up with Hem/Onc for thrombocytopenia and elevated LFTs. Most likely ITP. Under follow up only. No treatment at the present time. He had upper endoscopy for c/o dysphagia, and colonoscopy. During the last colonoscopy he states that they found "polyps"   patient states  That he had CP in the past, no CP now, pain RLE and LLE when he walks, no pain at rest,   denies HA, CP, SOB, palpiations, dizziness, Orthopnea  He currently presents to [facility] for c with possibel intervention       Symptoms:        Angina (Class): 2       Ischemic Symptoms: CP, BOONE, Le pain     Heart Failure: no       Systolic/Diastolic/Combined:        NYHA Class (within 2 weeks):     Assessment of LVEF (Must be within 6 months):       EF:        Assessed by:        Date:     Prior Cardiac Interventions:       PCI's (Date, Stents, Vessels):        CABG (Date, Grafts):     Noninvasive Testing:   Stress Test: Date: 10/20/24        Protocol: Regadenoson       Duration of Exercise:      Myocardial Imaging:  Myocardial Perfusion: Abnormal with scan evidence of basal to mid inferior ischemia and RV tracer uptake.  2. Qualitative Perfusion:  - medium-sized, mild defect(s) in the basal inferior and mid inferior walls that are partially reversible, partially corrects with prone imaging suggestive of ischemia.  3. The left ventricle is mildly decreased in function. The post stress left ventricular EF is 46 %. The stress end diastolic volume is 91 ml and systolic volume is 49 ml.  4. Incidental Findings: RV tracer uptake partially in rest and predominantely stress is noted.  5. The patient underwent stress testing using pharmacological IV regadenoson (bolus injection, 400mcg over 10 to 20 seconds followed by 5ml flush) protocol.  _ The total procedure time was 4 minutes . The test was stopped due to completion of protocol.  The heart rate response was normal pharmocologic heart rate response.  6. Baseline electrocardiogram: sinus rhythm at a rate of 66 bpm with no sigificant ST abnomalities.  7. Stress electrocardiogram: No significant ischemic ST segment changes.         Risk Assessment (Low, Medium, High):     Echo (Date, Findings):     Antianginal Therapies:        Beta Blockers:         Calcium Channel Blockers:        Long Acting Nitrates:        Ranexa:     Risk Assessments:  ASA:  Mallampati:  GFR:   Cr:  BRA:    Associated Risk Factors:        Cerebrovascular Disease: N/A       Chronic Lung Disease: N/A       Peripheral Arterial Disease: N/A       Chronic Kidney Disease (if yes, what is GFR): N/A       Uncontrolled Diabetes (if yes, what is HgbA1C or FBS): N/A       Poorly Controlled Hypertension (if yes, what is SBP): N/A       Morbid Obesity (if yes, what is BMI): N/A       History of Recent Ventricular Arrhythmia: N/A       Inability to Ambulate Safely: N/A       Need for Therapeutic Anticoagulation: N/A       Antiplatelet or Contrast Allergy: N/A    Impression:    Plan:    -plan for *** via ***  -patient seen and examined  -confirmed appropriate NPO duration  -ECG and Labs reviewed  -Aspirin 81mg po pre-cath  -procedure discussed with patient; risks and benefits explained, questions answered  -consent obtained by attending IC     HPI:     68 y/o male who is a current smoker, with PMhx of COPD, Hyperlipidemia, thrombocytopenia, PAD, s/p RLE angiography 1/20/2023 revealing right SFA , Balloon angioplasty , , and stent ) of the right SFA. Now has left leg severe lifestyle limiting,  recent left Duplex showing severe stenosis in his distal left SFA., Rt knee surgery in 2014  .Underwent nuclear stress test in 3/4/2021 that revealed normal perfusion and normal EF.  Echocardiogram performed 1/14/2021 and 9/8/2023 showed an LVEF=52% no significant change compared to previous.  Has been under follow up with Hem/Onc for thrombocytopenia and elevated LFTs. Most likely ITP. Under follow up only. No treatment at the present time. He had upper endoscopy for c/o dysphagia, and colonoscopy. During the last colonoscopy he states that they found "polyps"   His NST in 12/24 revealed medium-sized, mild defect(s) in the basal inferior and mid inferior walls that are partially reversible, partially corrects with prone imaging suggestive of ischemia,  The left ventricle is mildly decreased in function. The post stress left ventricular EF is 46 %.  Patient states  that he had CP in the past, no CP now, pain RLE and LLE when he walks, no pain at rest,   denies HA, CP, SOB, palpitations,  dizziness, Orthopnea  He currently presents to [facility] for [facility]  with possible intervention       Symptoms:        Angina (Class): 2       Ischemic Symptoms: CP, BOONE, LE  pain     Heart Failure: no       Systolic/Diastolic/Combined:        NYHA Class (within 2 weeks):     Assessment of LVEF        EF:        Assessed by:        Date:     Prior Cardiac Interventions:       PCI's (Date, Stents, Vessels):        CABG (Date, Grafts):     Noninvasive Testing:   Stress Test: Date: 10/20/24        Protocol: Regadenoson       Duration of Exercise:      Myocardial Imaging:  Myocardial Perfusion: Abnormal with scan evidence of basal to mid inferior ischemia and RV tracer uptake.  . Qualitative Perfusion:  - medium-sized, mild defect(s) in the basal inferior and mid inferior walls that are partially reversible, partially corrects with prone imaging suggestive of ischemia.   The left ventricle is mildly decreased in function. The post stress left ventricular EF is 46 %. The stress end diastolic volume is 91 ml and systolic volume is 49 ml.   Incidental Findings: RV tracer uptake partially in rest and predominantely stress is noted.  . The patient underwent stress testing using pharmacological IV regadenoson (bolus injection, 400mcg over 10 to 20 seconds followed by 5ml flush) protocol.  _ The total procedure time was 4 minutes . The test was stopped due to completion of protocol.  The heart rate response was normal pharmocologic heart rate response.  Baseline electrocardiogram: sinus rhythm at a rate of 66 bpm with no sigificant ST abnomalities.  Stress electrocardiogram: No significant ischemic ST segment changes.         Risk Assessment (Low, Medium, High):     Echo (Date, Findings):     Patient tachycardic during the entire study.  . The left atrium is normal in size.  . Left ventricular systolic function is low normal with an ejection fraction of 52 % by Anderson's method of disks.   There is mild (grade 1) left ventricular diastolic dysfunction.   The right atrium is normal in size.   Normal right ventricular systolic function.  . No significant valvular disease.  9. No pericardial effusion seen.  10. Compared to the transthoracic echocardiogram performed on 1/14/2021 there have been no significant interval changes.           Beta Blockers:         Calcium Channel Blockers:        Long Acting Nitrates:        Ranexa:     Risk Assessments:  ASA:  Mallampati:  GFR:   Cr:  BRA:    Associated Risk Factors:        Cerebrovascular Disease: N/A       Chronic Lung Disease: N/A       Peripheral Arterial Disease: N/A       Chronic Kidney Disease (if yes, what is GFR): N/A       Uncontrolled Diabetes (if yes, what is HgbA1C or FBS): N/A       Poorly Controlled Hypertension (if yes, what is SBP): N/A       Morbid Obesity (if yes, what is BMI): N/A       History of Recent Ventricular Arrhythmia: N/A       Inability to Ambulate Safely: N/A       Need for Therapeutic Anticoagulation: N/A       Antiplatelet or Contrast Allergy: N/A    Impression:    Plan:    -plan for *** via ***  -patient seen and examined  -confirmed appropriate NPO duration  -ECG and Labs reviewed  -Aspirin 81mg po pre-cath  -procedure discussed with patient; risks and benefits explained, questions answered  -consent obtained by attending IC     HPI:     68 y/o male who is a current smoker, with PMhx of COPD, Hyperlipidemia, thrombocytopenia, PAD, s/p RLE angiography 1/20/2023 revealing right SFA , Balloon angioplasty , , and stent ) of the right SFA. Now has left leg severe lifestyle limiting,  recent left Duplex showing severe stenosis in his distal left SFA., Rt knee surgery in 2014  .Underwent nuclear stress test in 3/4/2021 that revealed normal perfusion and normal EF.  Echocardiogram performed 1/14/2021 and 9/8/2023 showed an LVEF=52% no significant change compared to previous.  Has been under follow up with Hem/Onc for thrombocytopenia and elevated LFTs. Most likely ITP. Under follow up only. No treatment at the present time. He had upper endoscopy for c/o dysphagia, and colonoscopy. During the last colonoscopy he states that they found "polyps"   His NST in 12/24 revealed medium-sized, mild defect(s) in the basal inferior and mid inferior walls that are partially reversible, partially corrects with prone imaging suggestive of ischemia,  The left ventricle is mildly decreased in function. The post stress left ventricular EF is 46 %.  Patient states  that he had CP in the past, no CP now, pain RLE and LLE when he walks, no pain at rest,   denies HA, CP, SOB, palpitations,  dizziness, Orthopnea  He currently presents to [facility]  with possible intervention       Symptoms:        Angina (Class): 2       Ischemic Symptoms: CP, BOONE, LE  pain     Heart Failure: no       Systolic/Diastolic/Combined:        NYHA Class (within 2 weeks):     Assessment of LVEF        EF: 51%       Assessed by: TTE       Date: 2024    Prior Cardiac Interventions:       PCI's (Date, Stents, Vessels):        CABG (Date, Grafts):     Noninvasive Testing:   Stress Test: Date: 10/20/24        Protocol: Regadenoson       Duration of Exercise:      Myocardial Imaging:  Myocardial Perfusion: Abnormal with scan evidence of basal to mid inferior ischemia and RV tracer uptake.  . Qualitative Perfusion:  - medium-sized, mild defect(s) in the basal inferior and mid inferior walls that are partially reversible, partially corrects with prone imaging suggestive of ischemia.   The left ventricle is mildly decreased in function. The post stress left ventricular EF is 46 %. The stress end diastolic volume is 91 ml and systolic volume is 49 ml.   Incidental Findings: RV tracer uptake partially in rest and predominantely stress is noted.  . The patient underwent stress testing using pharmacological IV regadenoson (bolus injection, 400mcg over 10 to 20 seconds followed by 5ml flush) protocol.  _ The total procedure time was 4 minutes . The test was stopped due to completion of protocol.  The heart rate response was normal pharmocologic heart rate response.  Baseline electrocardiogram: sinus rhythm at a rate of 66 bpm with no sigificant ST abnomalities.  Stress electrocardiogram: No significant ischemic ST segment changes.         Risk Assessment (Low, Medium, High):     Echo (Date, Findings):     Patient tachycardic during the entire study.  . The left atrium is normal in size.  . Left ventricular systolic function is low normal with an ejection fraction of 52 % by Anderson's method of disks.   There is mild (grade 1) left ventricular diastolic dysfunction.   The right atrium is normal in size.   Normal right ventricular systolic function.  . No significant valvular disease.  9. No pericardial effusion seen.  10. Compared to the transthoracic echocardiogram performed on 1/14/2021 there have been no significant interval changes.           Beta Blockers:  NONE       Calcium Channel Blockers: NONE       Long Acting Nitrates: NONE       Ranexa: NONE      Associated Risk Factors:        Cerebrovascular Disease: N/A       Chronic Lung Disease: N/A       Peripheral Arterial Disease: N/A       Chronic Kidney Disease (if yes, what is GFR): N/A       Uncontrolled Diabetes (if yes, what is HgbA1C or FBS): N/A       Poorly Controlled Hypertension (if yes, what is SBP): N/A       Morbid Obesity (if yes, what is BMI): N/A       History of Recent Ventricular Arrhythmia: N/A       Inability to Ambulate Safely: N/A       Need for Therapeutic Anticoagulation: N/A       Antiplatelet or Contrast Allergy: N/A    Impression:       HPI:     68 y/o male who is a current smoker, with PMhx of COPD, Hyperlipidemia, thrombocytopenia, PAD, s/p RLE angiography 1/20/2023 revealing right SFA , Balloon angioplasty , , and stent ) of the right SFA. Now has left leg severe lifestyle limiting,  recent left Duplex showing severe stenosis in his distal left SFA., Rt knee surgery in 2014, infected rt knee wound, wound vac present.  .Underwent nuclear stress test in 3/4/2021 that revealed normal perfusion and normal EF.  Echocardiogram performed 1/14/2021 and 9/8/2023 showed an LVEF=52% no significant change compared to previous.  Has been under follow up with Hem/Onc for thrombocytopenia and elevated LFTs. Most likely ITP. Under follow up only. No treatment at the present time. He had upper endoscopy for c/o dysphagia, and colonoscopy. During the last colonoscopy he states that they found "polyps"   His NST in 12/24 revealed medium-sized, mild defect(s) in the basal inferior and mid inferior walls that are partially reversible, partially corrects with prone imaging suggestive of ischemia,  The left ventricle is mildly decreased in function. The post stress left ventricular EF is 46 %.  Patient states  that he had CP in the past, no CP now, pain RLE and LLE when he walks, no pain at rest,   denies HA, CP, SOB, palpitations,  dizziness, Orthopnea  He currently presents to [facility] for [facility]  with possible intervention       Symptoms:        Angina (Class): 2       Ischemic Symptoms: CP, BOONE, LE  pain     Heart Failure: no       Systolic/Diastolic/Combined:        NYHA Class (within 2 weeks):     Assessment of LVEF        EF: 51%       Assessed by: TTE       Date: 2024    Prior Cardiac Interventions:       PCI's (Date, Stents, Vessels):        CABG (Date, Grafts):     Noninvasive Testing:   Stress Test: Date: 10/20/24        Protocol: Regadenoson       Duration of Exercise:      Myocardial Imaging:  Myocardial Perfusion: Abnormal with scan evidence of basal to mid inferior ischemia and RV tracer uptake.  . Qualitative Perfusion:  - medium-sized, mild defect(s) in the basal inferior and mid inferior walls that are partially reversible, partially corrects with prone imaging suggestive of ischemia.   The left ventricle is mildly decreased in function. The post stress left ventricular EF is 46 %. The stress end diastolic volume is 91 ml and systolic volume is 49 ml.   Incidental Findings: RV tracer uptake partially in rest and predominantely stress is noted.  . The patient underwent stress testing using pharmacological IV regadenoson (bolus injection, 400mcg over 10 to 20 seconds followed by 5ml flush) protocol.  _ The total procedure time was 4 minutes . The test was stopped due to completion of protocol.  The heart rate response was normal pharmocologic heart rate response.  Baseline electrocardiogram: sinus rhythm at a rate of 66 bpm with no sigificant ST abnomalities.  Stress electrocardiogram: No significant ischemic ST segment changes.         Risk Assessment (Low, Medium, High):     Echo (Date, Findings):     Patient tachycardic during the entire study.  . The left atrium is normal in size.  . Left ventricular systolic function is low normal with an ejection fraction of 52 % by Anderson's method of disks.   There is mild (grade 1) left ventricular diastolic dysfunction.   The right atrium is normal in size.   Normal right ventricular systolic function.  . No significant valvular disease.  9. No pericardial effusion seen.  10. Compared to the transthoracic echocardiogram performed on 1/14/2021 there have been no significant interval changes.           Beta Blockers:  NONE       Calcium Channel Blockers: NONE       Long Acting Nitrates: NONE       Ranexa: NONE      Associated Risk Factors:        Cerebrovascular Disease: N/A       Chronic Lung Disease: N/A       Peripheral Arterial Disease: N/A       Chronic Kidney Disease (if yes, what is GFR): N/A       Uncontrolled Diabetes (if yes, what is HgbA1C or FBS): N/A       Poorly Controlled Hypertension (if yes, what is SBP): N/A       Morbid Obesity (if yes, what is BMI): N/A       History of Recent Ventricular Arrhythmia: N/A       Inability to Ambulate Safely: N/A       Need for Therapeutic Anticoagulation: N/A       Antiplatelet or Contrast Allergy: N/A    Impression:

## 2024-12-19 NOTE — PROGRESS NOTE ADULT - ASSESSMENT
68 y/o male who is a current smoker, with PMhx of COPD, Hyperlipidemia, thrombocytopenia, PAD, s/p RLE angiography 1/20/2023 revealing right SFA , Balloon angioplasty , , and stent ) of the right SFA. Now has left leg severe lifestyle limiting,  recent left Duplex showing severe stenosis in his distal left SFA., Rt knee surgery in 2014, infected rt knee wound, wound vac present.  .Underwent nuclear stress test in 3/4/2021 that revealed normal perfusion and normal EF.  Echocardiogram performed 1/14/2021 and 9/8/2023 showed an LVEF=52% no significant change compared to previous.  Has been under follow up with Hem/Onc for thrombocytopenia and elevated LFTs. Most likely ITP. Under follow up only. No treatment at the present time. He had upper endoscopy for c/o dysphagia, and colonoscopy. During the last colonoscopy he states that they found "polyps"   His NST in 12/24 revealed medium-sized, mild defect(s) in the basal inferior and mid inferior walls that are partially reversible, partially corrects with prone imaging suggestive of ischemia,  The left ventricle is mildly decreased in function. The post stress left ventricular EF is 46 %.  Patient states  that he had CP in the past, no CP now, pain RLE and LLE when he walks, no pain at rest,   He  presented  to [facility] for LHC  with possible intervention   Patient  now s/p left heart catheterization via  approach with Dr. Felton,  tolerated procedure well without complications.   RLE warm, perfusing, no bleeding, no hematoma, NV status intact     Intraprocedurally:    Midazolam 1mg iv  fentanyl 50 mcg ivp x1     Prelim cath Findings:   S/P LHC VIA RFA           -ADMIT due to: / Pt is already in-patient  -post cardiac cath orders  -radial or groin precautions  -bedrest x hours post procedure  -EKG post cath  -labs and EKG in am  -NS 0.9% 250ml/hr x 1 bolus: post procedure SERGIO ppx   -continue current medical therapy  -Dual anti platelet therapy with aspirin/ plavix (brilinta), reinforced importance of strict adherence to DAPT   -statin therapy  -beta blocker  -follow up outpt in 2 weeks with Cardiologist  -[facility] following during hospitalization  -Lifestyle modifications discussed to reduce cardiovascular risk factors including weight reduction, smoking cessation, medication compliance, and routine follow up with Cardiologist to track your BMI, cholesterol, and glucose levels.   - .rehab  -Management per Hospitalist / ICU  -Discharge in am if overnight tele, EKG, labs in am all remain WNL     70 y/o male who is a current smoker, with PMhx of COPD, Hyperlipidemia, thrombocytopenia, PAD, s/p RLE angiography 1/20/2023 revealing right SFA , Balloon angioplasty , , and stent ) of the right SFA. Now has left leg severe lifestyle limiting,  recent left Duplex showing severe stenosis in his distal left SFA., Rt knee surgery in 2014, infected rt knee wound, wound vac present.  .Underwent nuclear stress test in 3/4/2021 that revealed normal perfusion and normal EF.  Echocardiogram performed 1/14/2021 and 9/8/2023 showed an LVEF=52% no significant change compared to previous.  Has been under follow up with Hem/Onc for thrombocytopenia and elevated LFTs. Most likely ITP. Under follow up only. No treatment at the present time. He had upper endoscopy for c/o dysphagia, and colonoscopy. During the last colonoscopy he states that they found "polyps"   His NST in 12/24 revealed medium-sized, mild defect(s) in the basal inferior and mid inferior walls that are partially reversible, partially corrects with prone imaging suggestive of ischemia,  The left ventricle is mildly decreased in function. The post stress left ventricular EF is 46 %.  Patient states  that he had CP in the past, no CP now, pain RLE and LLE when he walks, no pain at rest,   He  presented  to [facility] for LHC  with possible intervention   Code grey pre procedure, while in procedure room as patient satrted yelling and screaming, didn't like the idea of using his rt wrist for cath, was not confused, calm down after fentanyl 25 mcg ivp, pt calm now, a&ox4. no focal deficits , follows verbal commands   Patient  now s/p left heart catheterization via  approach with Dr. Felton,  tolerated procedure well without complications.   RLE warm, perfusing, no bleeding, no hematoma, NV status intact     Intraprocedurally:    Midazolam 1mg iv  fentanyl 50 mcg ivp x1     Omnipaque: 43 ml     Prelim cath Findings:   S/P LHC VIA RFA   Non obstructive CAD    Official cath report pending     # S/P Diagnostic LHC VIA RFA/ Non obstructive CAD    -post cardiac cath management per protocol  -Rt  groin precautions  -bedrest x 2 hours post procedure, patient can be OOb after 2 hours     -NS 0.9% 250ml/hr x 1 bolus: post procedure SERGIO ppx   -continue current medical therapy including aspirin, plavix, and statin, rest of the home medication regimen   -follow up outpt in 2 weeks with Cardiologist DR Pineda/Purnima   -Lifestyle modifications discussed to reduce cardiovascular risk factors including weight reduction, smoking cessation, medication compliance, and routine follow up with Cardiologist to track your BMI, cholesterol, and glucose levels.   - Patient is not a candidate for cardiac rehab sec to diagnostic cath   -Discharge at 16: 30 pm once criteria met EKG, labs in am all remain WNL

## 2024-12-19 NOTE — H&P PST ADULT - ASSESSMENT
68 y/o male who is a current smoker, with PMhx of COPD, Hyperlipidemia, thrombocytopenia, PAD, s/p RLE angiography 1/20/2023 revealing right SFA , Balloon angioplasty , , and stent ) of the right SFA. Now has left leg severe lifestyle limiting,  recent left Duplex showing severe stenosis in his distal left SFA., Rt knee surgery in 2014  .Underwent nuclear stress test in 3/4/2021 that revealed normal perfusion and normal EF.  Echocardiogram performed 1/14/2021 and 9/8/2023 showed an LVEF=52% no significant change compared to previous.  Has been under follow up with Hem/Onc for thrombocytopenia and elevated LFTs. Most likely ITP. Under follow up only. No treatment at the present time. He had upper endoscopy for c/o dysphagia, and colonoscopy. During the last colonoscopy he states that they found "polyps"   His NST in 12/24 revealed medium-sized, mild defect(s) in the basal inferior and mid inferior walls that are partially reversible, partially corrects with prone imaging suggestive of ischemia,  The left ventricle is mildly decreased in function. The post stress left ventricular EF is 46 %.  Patient states  that he had CP in the past, no CP now, pain RLE and LLE when he walks, no pain at rest,   He currently presents to [facility] for LHC  with possible intervention     Risk Assessments:  ASA: 3  Mallampati: 2  GFR: 62  Cr: 1.27  BRA: 1.4 %  Pt assessed, appropriate for sedation, pt educated regarding the plan for Versed/Fentanyl as needed.      PlaN  -plan for LHC Via RRA /RFA   -patient seen and examined  -confirmed appropriate NPO duration  -ECG and Labs reviewed  -Aspirin 81mg po pre-cath  -procedure discussed with patient; risks and benefits explained, questions answered  -consent obtained by attending DR Felton           70 y/o male who is a current smoker, with PMhx of COPD, Hyperlipidemia, thrombocytopenia, PAD, s/p RLE angiography 1/20/2023 revealing right SFA , Balloon angioplasty , , and stent ) of the right SFA. Now has left leg severe lifestyle limiting,  recent left Duplex showing severe stenosis in his distal left SFA., Rt knee surgery in 2014  .Underwent nuclear stress test in 3/4/2021 that revealed normal perfusion and normal EF.  Echocardiogram performed 1/14/2021 and 9/8/2023 showed an LVEF=52% no significant change compared to previous.  Has been under follow up with Hem/Onc for thrombocytopenia and elevated LFTs. Most likely ITP. Under follow up only. No treatment at the present time. He had upper endoscopy for c/o dysphagia, and colonoscopy. During the last colonoscopy he states that they found "polyps",  Rt knee surgery in 2014, infected rt knee wound, wound vac present.  His NST in 12/24 revealed medium-sized, mild defect(s) in the basal inferior and mid inferior walls that are partially reversible, partially corrects with prone imaging suggestive of ischemia,  The left ventricle is mildly decreased in function. The post stress left ventricular EF is 46 %.  Patient states  that he had CP in the past, no CP now, pain RLE and LLE when he walks, no pain at rest,    He currently presents to [facility] for LHC  with possible intervention     Risk Assessments:  ASA: 3  Mallampati: 2  GFR: 62  Cr: 1.27  BRA: 1.4 %  Pt assessed, appropriate for sedation, pt educated regarding the plan for Versed/Fentanyl as needed.      PlaN  -plan for LHC Via RRA /RFA   -patient seen and examined  -confirmed appropriate NPO duration  -ECG and Labs reviewed  -Aspirin 81mg po pre-cath  -procedure discussed with patient; risks and benefits explained, questions answered  -consent obtained by attending DR Felton

## 2024-12-19 NOTE — DISCHARGE NOTE NURSING/CASE MANAGEMENT/SOCIAL WORK - PATIENT PORTAL LINK FT
You can access the FollowMyHealth Patient Portal offered by [facility] by registering at the following website: [email or web address]. By joining [employer]’s FollowMyHealth portal, you will also be able to view your health information using other applications (apps) compatible with our system.

## 2024-12-19 NOTE — PROGRESS NOTE ADULT - NS ATTEND AMEND GEN_ALL_CORE FT
I have seen and examined the patient and agree with the assessment and plan as documented. Here for LHC after abnormal Stress test.

## 2024-12-19 NOTE — PROGRESS NOTE ADULT - SUBJECTIVE AND OBJECTIVE BOX
Department of Cardiology                                                                  [facility]/[place] [address], [place]                                                            Telephone: [phone]. Fax:[phone]                                                    Post- Procedure Note: Left Heart Cardiac Catheterization       Narrative:   Patient now s/p left heart catheterization via  approach with Dr. Felton, "johnny Grey" called while pt in procedure room, pre procedure, while  transferring the pt to procedure table, pt got upset, didnt"t want RRA approach for cath, per intraprocedural team  pt was  never confused, still A&OX4, received fentanyl 25mcg ivp for sedation and became calm,  tolerated procedure well without complications. Arrived to recovery room NAD and hemodynamically stable, distal pulse +, neurovascular intact          PAST MEDICAL & SURGICAL HISTORY:  COPD (chronic obstructive pulmonary disease)      Back pain      Mixed hyperlipidemia      Thrombocytopenia      PAD (peripheral artery disease)      GERD (gastroesophageal reflux disease)      Pain due to internal orthopedic prosthetic device, initial encounter      Prediabetes      Sinus tachycardia      History of MI (myocardial infarction)      Major depression      History of right knee surgery      S/P angiography      S/P angioplasty with stent        Home Medications:  albuterol 90 mcg/inh inhalation aerosol: 2 puff(s) inhaled every 6 hours (19 Dec 2024 08:45)  Combivent Respimat 20 mcg-100 mcg/inh inhalation aerosol: inhaled 2 times a day (19 Dec 2024 08:45)  Crestor 10 mg oral tablet: 1 tab(s) orally once a day (at bedtime) (19 Dec 2024 08:45)  cyclobenzaprine 5 mg oral tablet: 2 tab(s) orally once a day (at bedtime) (19 Dec 2024 08:45)  famotidine 40 mg oral tablet: 1 tab(s) orally once a day (19 Dec 2024 08:45)  lactulose 10 g/15 mL oral syrup: 15 milliliter(s) orally as needed for  constipation (19 Dec 2024 08:45)  montelukast 10 mg oral tablet: 1 tab(s) orally once a day (19 Dec 2024 08:45)  Multiple Vitamins oral tablet: 1 tab(s) orally once a day (19 Dec 2024 08:45)  naproxen 250 mg oral tablet: 2 tab(s) orally once a day (19 Dec 2024 08:45)  pantoprazole 40 mg oral delayed release tablet: 1 tab(s) orally once a day (19 Dec 2024 08:45)        No Known Allergies      Objective:  Vital Signs Last 24 Hrs  T(C): 36.7 (19 Dec 2024 08:31), Max: 36.7 (19 Dec 2024 08:31)  T(F): 98.1 (19 Dec 2024 08:31), Max: 98.1 (19 Dec 2024 08:31)  HR: 110 (19 Dec 2024 08:31) (110 - 110)  BP: 133/92 (19 Dec 2024 08:31) (133/92 - 133/92)  BP(mean): --  RR: 16 (19 Dec 2024 08:31) (16 - 16)  SpO2: 99% (19 Dec 2024 08:31) (99% - 99%)    Parameters below as of 19 Dec 2024 08:31  Patient On (Oxygen Delivery Method): room air        GENERAL: Pt lying comfortably, NAD.  ENMT: PERRL, +EOMI.  NECK: soft, Supple, No JVD,   CHEST/LUNG: Clear to auscultatation bilaterally; No wheezing.  HEART: S1S2+, Regular rate and rhythm; No murmurs.  ABDOMEN: Soft, Nontender, Nondistended; Bowel sounds present.  MUSCULOSKELETAL: Normal range of motion.  SKIN: No rashes or lesions.  NEURO: AAOX3, no focal deficits, no motor r sensory loss.  PSYCH: normal mood.  Procedure site: RFA  no signs of bleeding or hematoma, neurovascular intact   VASCULAR:   Radial +2 R/+2 L  Femoral +2 R/+2 L  PT +2 R/+2 L  DP +2 R/+2 L                          14.2   8.32  )-----------( 104      ( 19 Dec 2024 08:49 )             42.1     12-19    139  |  102  |  15.2  ----------------------------<  117[H]  4.9   |  22.0  |  1.27    Ca    9.3      19 Dec 2024 08:49  Mg     1.8     12-19                                                                                      Department of Cardiology                                                                  [facility]/[place] [address]                                                            Telephone: [phone]. Fax:[phone]                                                    Post- Procedure Note: Left Heart Cardiac Catheterization       Narrative:   Patient now s/p left heart catheterization via  approach with Dr. Felton, "johnny Grey" called while pt in procedure room, pre procedure, while  transferring the pt to procedure table, pt got upset, started screaming  at staff,  didn't want RRA approach for cath, per intraprocedural team  pt was  never confused, still A&OX4, received fentanyl 25mcg ivp for sedation and became calm,  tolerated procedure well without complications. Arrived to recovery room NAD and hemodynamically stable, distal pulse +, neurovascular intact          PAST MEDICAL & SURGICAL HISTORY:  COPD (chronic obstructive pulmonary disease)      Back pain      Mixed hyperlipidemia      Thrombocytopenia      PAD (peripheral artery disease)      GERD (gastroesophageal reflux disease)      Pain due to internal orthopedic prosthetic device, initial encounter      Prediabetes      Sinus tachycardia      History of MI (myocardial infarction)      Major depression      History of right knee surgery      S/P angiography      S/P angioplasty with stent        Home Medications:  albuterol 90 mcg/inh inhalation aerosol: 2 puff(s) inhaled every 6 hours (19 Dec 2024 08:45)  Combivent Respimat 20 mcg-100 mcg/inh inhalation aerosol: inhaled 2 times a day (19 Dec 2024 08:45)  Crestor 10 mg oral tablet: 1 tab(s) orally once a day (at bedtime) (19 Dec 2024 08:45)  cyclobenzaprine 5 mg oral tablet: 2 tab(s) orally once a day (at bedtime) (19 Dec 2024 08:45)  famotidine 40 mg oral tablet: 1 tab(s) orally once a day (19 Dec 2024 08:45)  lactulose 10 g/15 mL oral syrup: 15 milliliter(s) orally as needed for  constipation (19 Dec 2024 08:45)  montelukast 10 mg oral tablet: 1 tab(s) orally once a day (19 Dec 2024 08:45)  Multiple Vitamins oral tablet: 1 tab(s) orally once a day (19 Dec 2024 08:45)  naproxen 250 mg oral tablet: 2 tab(s) orally once a day (19 Dec 2024 08:45)  pantoprazole 40 mg oral delayed release tablet: 1 tab(s) orally once a day (19 Dec 2024 08:45)        No Known Allergies      Objective:  Vital Signs Last 24 Hrs  T(C): 36.7 (19 Dec 2024 08:31), Max: 36.7 (19 Dec 2024 08:31)  T(F): 98.1 (19 Dec 2024 08:31), Max: 98.1 (19 Dec 2024 08:31)  HR: 110 (19 Dec 2024 08:31) (110 - 110)  BP: 133/92 (19 Dec 2024 08:31) (133/92 - 133/92)  BP(mean): --  RR: 16 (19 Dec 2024 08:31) (16 - 16)  SpO2: 99% (19 Dec 2024 08:31) (99% - 99%)    Parameters below as of 19 Dec 2024 08:31  Patient On (Oxygen Delivery Method): room air        GENERAL: Pt lying comfortably, NAD.  ENMT: PERRL, +EOMI.  NECK: soft, Supple, No JVD,   CHEST/LUNG: Clear to auscultatation bilaterally; No wheezing.  HEART: S1S2+, Regular rate and rhythm; No murmurs.  ABDOMEN: Soft, Nontender, Nondistended; Bowel sounds present.  MUSCULOSKELETAL: Normal range of motion.  SKIN: No rashes or lesions.  NEURO: AAOX3, no focal deficits, no motor r sensory loss.  PSYCH: normal mood.  Procedure site: RFA  no signs of bleeding or hematoma, neurovascular intact   VASCULAR:   Radial +2 R/+2 L  Femoral +2 R/+2 L  PT +2 R/+2 L  DP +2 R/+2 L                          14.2   8.32  )-----------( 104      ( 19 Dec 2024 08:49 )             42.1     12-19    139  |  102  |  15.2  ----------------------------<  117[H]  4.9   |  22.0  |  1.27    Ca    9.3      19 Dec 2024 08:49  Mg     1.8     12-19

## 2024-12-19 NOTE — DISCHARGE NOTE PROVIDER - NSDCMRMEDTOKEN_GEN_ALL_CORE_FT
albuterol 90 mcg/inh inhalation aerosol: 2 puff(s) inhaled every 6 hours  Aspirin EC 81 mg oral delayed release tablet: 1 tab(s) orally every 12 hours MDD: 2  Bactrim  mg-160 mg oral tablet: 1 tab(s) orally every 12 hours  clopidogrel 75 mg oral tablet: 1 tab(s) orally once a day  Combivent Respimat 20 mcg-100 mcg/inh inhalation aerosol: inhaled 2 times a day  Crestor 10 mg oral tablet: 1 tab(s) orally once a day (at bedtime)  cyclobenzaprine 5 mg oral tablet: 2 tab(s) orally once a day (at bedtime)  famotidine 40 mg oral tablet: 1 tab(s) orally once a day  lactulose 10 g/15 mL oral syrup: 15 milliliter(s) orally as needed for  constipation  montelukast 10 mg oral tablet: 1 tab(s) orally once a day  Multiple Vitamins oral tablet: 1 tab(s) orally once a day  oxyCODONE 5 mg oral tablet: 1 tab(s) orally every 6 hours as needed for  severe pain MDD: 4  pantoprazole 40 mg oral delayed release tablet: 1 tab(s) orally once a day  Senna Plus 50 mg-8.6 mg oral tablet: 2 tab(s) orally once a day (at bedtime)

## 2024-12-19 NOTE — DISCHARGE NOTE PROVIDER - HOSPITAL COURSE
Assessment    68 y/o male who is a current smoker, with PMhx of COPD, Hyperlipidemia, thrombocytopenia, PAD, s/p RLE angiography 1/20/2023 revealing right SFA , Balloon angioplasty , , and stent ) of the right SFA. Now has left leg severe lifestyle limiting,  recent left Duplex showing severe stenosis in his distal left SFA., Rt knee surgery in 2014, infected rt knee wound, wound vac present.  .Underwent nuclear stress test in 3/4/2021 that revealed normal perfusion and normal EF.  Echocardiogram performed 1/14/2021 and 9/8/2023 showed an LVEF=52% no significant change compared to previous.  Has been under follow up with Hem/Onc for thrombocytopenia and elevated LFTs. Most likely ITP. Under follow up only. No treatment at the present time. He had upper endoscopy for c/o dysphagia, and colonoscopy. During the last colonoscopy he states that they found "polyps"   His NST in 12/24 revealed medium-sized, mild defect(s) in the basal inferior and mid inferior walls that are partially reversible, partially corrects with prone imaging suggestive of ischemia,  The left ventricle is mildly decreased in function. The post stress left ventricular EF is 46 %.  Patient states  that he had CP in the past, no CP now, pain RLE and LLE when he walks, no pain at rest,   He  presented  to [facility] for LHC  with possible intervention   Code grey pre procedure, while in procedure room as patient started yelling and screaming, didn't like the idea of using his rt wrist for cath, was not confused, calm down after fentanyl 25 mcg ivp, pt calm now, a&ox4. no focal deficits , follows verbal commands   Patient  now s/p left heart catheterization via  approach with Dr. Felton,  tolerated procedure well without complications.   RLE warm, perfusing, no bleeding, no hematoma, NV status intact   He is sinus tachy with Hr in 90's to low 100's, mostly when sitting and standing up,due to pain, no leukocytosis, afebrile, ST is chronic per pt    Intraprocedurally:    Midazolam 1mg iv  fentanyl 50 mcg ivp x1     Omnipaque: 43 ml     Prelim cath Findings:   S/P LHC VIA RFA   Non obstructive CAD, 30% stenosis of LAD     Official cath report pending     # S/P Diagnostic LHC VIA RFA/ Non obstructive CAD    -post cardiac cath management per protocol  -Rt  groin precautions  -NS 0.9% 250ml/hr x 1 bolus: post procedure SERGIO ppx   -continue current medical therapy including aspirin, plavix, and statin, rest of the home medication regimen   -follow up outpt in 2 weeks with Cardiologist DR Pineda/Purnima   -Lifestyle modifications discussed to reduce cardiovascular risk factors including weight reduction, smoking cessation, medication compliance, and routine follow up with Cardiologist to track your BMI, cholesterol, and glucose levels.   - Patient is not a candidate for cardiac rehab sec to diagnostic cath   -rt KNEE Wound care management per his Surgery team  -Discharge at 16: 30 pm once criteria met EKG, labs in am all remain WNL  -Discussed with DR Felton

## 2024-12-19 NOTE — DISCHARGE NOTE PROVIDER - NSDCCPTREATMENT_GEN_ALL_CORE_FT
PRINCIPAL PROCEDURE  Procedure: Left heart cardiac cath  Findings and Treatment: patient s/p C today, revealed non obstructive CAD   No heavy lifting, driving, sex, tub baths, swimming, or any activity that submerges the lower half of the body in water for 48 hours.  Limited walking and stairs for 48 hours.    Change the bandaid after 24 hours and every 24 hours after that.  Keep the puncture site dry and covered with a bandaid until a scab forms.    Observe the site frequently.  If bleeding or a large lump (the size of a golf ball or bigger) occurs lie flat, apply continuous direct pressure just above the puncture site for at least 10 minutes, and notify your physician immediately.  If the bleeding cannot be controlled, call 911 immediately for assistance.  Notify your physician of pain, swelling or any drainage.    Notify your physician immediately if coldness, numbness, discoloration or pain in your foot occurs.

## 2024-12-19 NOTE — DISCHARGE NOTE PROVIDER - NSDCFUSCHEDAPPT_GEN_ALL_CORE_FT
[facility]  ORTHOSURG [address]  Scheduled Appointment: 12/31/2024    Yoon Mack  [facility]  Capri Kauffman  Scheduled Appointment: 01/16/2025

## 2024-12-19 NOTE — DISCHARGE NOTE NURSING/CASE MANAGEMENT/SOCIAL WORK - FINANCIAL ASSISTANCE
[facility] provides services at a reduced cost to those who are determined to be eligible through [facility]’s financial assistance program. Information regarding [facility]’s financial assistance program can be found by going to [email or web address] or by calling [phone].

## 2024-12-19 NOTE — DISCHARGE NOTE PROVIDER - CARE PROVIDER_API CALL
Walker Pineda  Cardiovascular Disease  [address] [place] [address]  Phone: [phone]  Fax: [phone]  Follow Up Time: 2 weeks

## 2024-12-22 LAB
CULTURE RESULTS: ABNORMAL
ORGANISM # SPEC MICROSCOPIC CNT: ABNORMAL
ORGANISM # SPEC MICROSCOPIC CNT: ABNORMAL
ORGANISM # SPEC MICROSCOPIC CNT: SIGNIFICANT CHANGE UP
ORGANISM # SPEC MICROSCOPIC CNT: SIGNIFICANT CHANGE UP
SPECIMEN SOURCE: SIGNIFICANT CHANGE UP

## 2024-12-31 ENCOUNTER — APPOINTMENT (OUTPATIENT)
Dept: ORTHOPEDIC SURGERY | Facility: CLINIC | Age: 69
End: 2024-12-31
Payer: MEDICARE

## 2024-12-31 DIAGNOSIS — Z98.890 OTHER SPECIFIED POSTPROCEDURAL STATES: ICD-10-CM

## 2024-12-31 PROCEDURE — 73560 X-RAY EXAM OF KNEE 1 OR 2: CPT | Mod: 26,RT

## 2024-12-31 PROCEDURE — 99024 POSTOP FOLLOW-UP VISIT: CPT

## 2025-01-13 ENCOUNTER — OUTPATIENT (OUTPATIENT)
Dept: OUTPATIENT SERVICES | Facility: HOSPITAL | Age: 70
LOS: 1 days | Discharge: HOME | End: 2025-01-13

## 2025-01-13 DIAGNOSIS — Z98.890 OTHER SPECIFIED POSTPROCEDURAL STATES: Chronic | ICD-10-CM

## 2025-01-13 DIAGNOSIS — Z95.820 PERIPHERAL VASCULAR ANGIOPLASTY STATUS WITH IMPLANTS AND GRAFTS: Chronic | ICD-10-CM

## 2025-01-13 DIAGNOSIS — D69.6 THROMBOCYTOPENIA, UNSPECIFIED: ICD-10-CM

## 2025-01-15 ENCOUNTER — NON-APPOINTMENT (OUTPATIENT)
Age: 70
End: 2025-01-15

## 2025-01-15 ENCOUNTER — APPOINTMENT (OUTPATIENT)
Dept: CARDIOLOGY | Facility: CLINIC | Age: 70
End: 2025-01-15
Payer: MEDICARE

## 2025-01-15 VITALS
HEIGHT: 72 IN | WEIGHT: 119 LBS | OXYGEN SATURATION: 96 % | SYSTOLIC BLOOD PRESSURE: 144 MMHG | HEART RATE: 90 BPM | BODY MASS INDEX: 16.12 KG/M2 | DIASTOLIC BLOOD PRESSURE: 92 MMHG

## 2025-01-15 DIAGNOSIS — R07.9 CHEST PAIN, UNSPECIFIED: ICD-10-CM

## 2025-01-15 DIAGNOSIS — R06.09 OTHER FORMS OF DYSPNEA: ICD-10-CM

## 2025-01-15 DIAGNOSIS — E78.5 HYPERLIPIDEMIA, UNSPECIFIED: ICD-10-CM

## 2025-01-15 DIAGNOSIS — I73.9 PERIPHERAL VASCULAR DISEASE, UNSPECIFIED: ICD-10-CM

## 2025-01-15 PROCEDURE — 99215 OFFICE O/P EST HI 40 MIN: CPT | Mod: 25

## 2025-01-15 PROCEDURE — 93000 ELECTROCARDIOGRAM COMPLETE: CPT

## 2025-01-15 RX ORDER — KRILL/OM-3/DHA/EPA/PHOSPHO/AST 1000-230MG
CAPSULE ORAL DAILY
Refills: 0 | Status: ACTIVE | COMMUNITY

## 2025-01-16 ENCOUNTER — RESULT REVIEW (OUTPATIENT)
Age: 70
End: 2025-01-16

## 2025-01-16 ENCOUNTER — APPOINTMENT (OUTPATIENT)
Dept: HEMATOLOGY ONCOLOGY | Facility: CLINIC | Age: 70
End: 2025-01-16
Payer: MEDICARE

## 2025-01-16 VITALS
HEART RATE: 107 BPM | HEIGHT: 72 IN | TEMPERATURE: 98 F | BODY MASS INDEX: 16.72 KG/M2 | OXYGEN SATURATION: 97 % | DIASTOLIC BLOOD PRESSURE: 88 MMHG | SYSTOLIC BLOOD PRESSURE: 141 MMHG | WEIGHT: 123.46 LBS

## 2025-01-16 LAB
BASOPHILS # BLD AUTO: 0 K/UL — SIGNIFICANT CHANGE UP (ref 0–0.2)
BASOPHILS NFR BLD AUTO: 0.8 % — SIGNIFICANT CHANGE UP (ref 0–2)
EOSINOPHIL # BLD AUTO: 0.1 K/UL — SIGNIFICANT CHANGE UP (ref 0–0.5)
EOSINOPHIL NFR BLD AUTO: 1.2 % — SIGNIFICANT CHANGE UP (ref 0–6)
HCT VFR BLD CALC: 44 % — SIGNIFICANT CHANGE UP (ref 39–50)
HGB BLD-MCNC: 14.7 G/DL — SIGNIFICANT CHANGE UP (ref 13–17)
LYMPHOCYTES # BLD AUTO: 1.8 K/UL — SIGNIFICANT CHANGE UP (ref 1–3.3)
LYMPHOCYTES # BLD AUTO: 36.1 % — SIGNIFICANT CHANGE UP (ref 13–44)
MCHC RBC-ENTMCNC: 31.5 PG — SIGNIFICANT CHANGE UP (ref 27–34)
MCHC RBC-ENTMCNC: 33.4 G/DL — SIGNIFICANT CHANGE UP (ref 32–36)
MCV RBC AUTO: 94.1 FL — SIGNIFICANT CHANGE UP (ref 80–100)
MONOCYTES # BLD AUTO: 0.4 K/UL — SIGNIFICANT CHANGE UP (ref 0–0.9)
MONOCYTES NFR BLD AUTO: 7.9 % — SIGNIFICANT CHANGE UP (ref 2–14)
NEUTROPHILS # BLD AUTO: 2.7 K/UL — SIGNIFICANT CHANGE UP (ref 1.8–7.4)
NEUTROPHILS NFR BLD AUTO: 54.1 % — SIGNIFICANT CHANGE UP (ref 43–77)
PLATELET # BLD AUTO: 133 K/UL — LOW (ref 150–400)
RBC # BLD: 4.67 M/UL — SIGNIFICANT CHANGE UP (ref 4.2–5.8)
RBC # FLD: 13.5 % — SIGNIFICANT CHANGE UP (ref 10.3–14.5)
WBC # BLD: 5 K/UL — SIGNIFICANT CHANGE UP (ref 3.8–10.5)
WBC # FLD AUTO: 5 K/UL — SIGNIFICANT CHANGE UP (ref 3.8–10.5)

## 2025-01-16 PROCEDURE — 99214 OFFICE O/P EST MOD 30 MIN: CPT

## 2025-01-16 RX ORDER — LACTULOSE 10 G/15 ML
10 SOLUTION, ORAL ORAL
Refills: 0 | Status: ACTIVE | COMMUNITY

## 2025-01-16 RX ORDER — PANTOPRAZOLE 40 MG/1
40 TABLET, DELAYED RELEASE ORAL DAILY
Refills: 0 | Status: ACTIVE | COMMUNITY

## 2025-01-16 RX ORDER — NAPROXEN 500 MG/1
500 TABLET ORAL
Refills: 0 | Status: ACTIVE | COMMUNITY

## 2025-01-21 RX ORDER — OXYCODONE 5 MG/1
5 TABLET ORAL
Qty: 20 | Refills: 0 | Status: ACTIVE | COMMUNITY
Start: 2025-01-21 | End: 1900-01-01

## 2025-03-14 ENCOUNTER — RX RENEWAL (OUTPATIENT)
Age: 70
End: 2025-03-14

## 2025-04-09 ENCOUNTER — APPOINTMENT (OUTPATIENT)
Dept: CARDIOLOGY | Facility: CLINIC | Age: 70
End: 2025-04-09

## 2025-04-09 ENCOUNTER — RX RENEWAL (OUTPATIENT)
Age: 70
End: 2025-04-09

## 2025-04-09 ENCOUNTER — OUTPATIENT (OUTPATIENT)
Dept: OUTPATIENT SERVICES | Facility: HOSPITAL | Age: 70
LOS: 1 days | Discharge: HOME | End: 2025-04-09

## 2025-04-09 DIAGNOSIS — Z98.890 OTHER SPECIFIED POSTPROCEDURAL STATES: Chronic | ICD-10-CM

## 2025-04-09 DIAGNOSIS — Z95.820 PERIPHERAL VASCULAR ANGIOPLASTY STATUS WITH IMPLANTS AND GRAFTS: Chronic | ICD-10-CM

## 2025-04-17 ENCOUNTER — RESULT REVIEW (OUTPATIENT)
Age: 70
End: 2025-04-17

## 2025-04-17 ENCOUNTER — APPOINTMENT (OUTPATIENT)
Dept: HEMATOLOGY ONCOLOGY | Facility: CLINIC | Age: 70
End: 2025-04-17
Payer: MEDICARE

## 2025-04-17 VITALS
WEIGHT: 132.72 LBS | SYSTOLIC BLOOD PRESSURE: 113 MMHG | OXYGEN SATURATION: 92 % | HEIGHT: 72 IN | HEART RATE: 89 BPM | DIASTOLIC BLOOD PRESSURE: 82 MMHG | BODY MASS INDEX: 17.98 KG/M2

## 2025-04-17 LAB
ANISOCYTOSIS BLD QL: SLIGHT — SIGNIFICANT CHANGE UP
BASOPHILS # BLD AUTO: 0.02 K/UL — SIGNIFICANT CHANGE UP (ref 0–0.2)
BASOPHILS NFR BLD AUTO: 0.3 % — SIGNIFICANT CHANGE UP (ref 0–2)
EOSINOPHIL # BLD AUTO: 0.07 K/UL — SIGNIFICANT CHANGE UP (ref 0–0.5)
EOSINOPHIL NFR BLD AUTO: 1.2 % — SIGNIFICANT CHANGE UP (ref 0–6)
HCT VFR BLD CALC: 45.4 % — SIGNIFICANT CHANGE UP (ref 39–50)
HGB BLD-MCNC: 15.3 G/DL — SIGNIFICANT CHANGE UP (ref 13–17)
IMM GRANULOCYTES # BLD AUTO: 0.02 K/UL — SIGNIFICANT CHANGE UP (ref 0–0.07)
IMM GRANULOCYTES NFR BLD AUTO: 0.3 % — SIGNIFICANT CHANGE UP (ref 0–0.9)
IMMATURE PLATELET FRACTION #: 6.8 K/UL — SIGNIFICANT CHANGE UP (ref 3.9–12.5)
IMMATURE PLATELET FRACTION %: 9.9 % — HIGH (ref 1.6–7.1)
LG PLATELETS BLD QL AUTO: SLIGHT — SIGNIFICANT CHANGE UP
LYMPHOCYTES # BLD AUTO: 1.81 K/UL — SIGNIFICANT CHANGE UP (ref 1–3.3)
LYMPHOCYTES NFR BLD AUTO: 30.4 % — SIGNIFICANT CHANGE UP (ref 13–44)
MACROCYTES BLD QL: SLIGHT — SIGNIFICANT CHANGE UP
MANUAL SMEAR VERIFICATION: SIGNIFICANT CHANGE UP
MCHC RBC-ENTMCNC: 31.7 PG — SIGNIFICANT CHANGE UP (ref 27–34)
MCHC RBC-ENTMCNC: 33.7 G/DL — SIGNIFICANT CHANGE UP (ref 32–36)
MCV RBC AUTO: 94 FL — SIGNIFICANT CHANGE UP (ref 80–100)
MICROCYTES BLD QL: SLIGHT — SIGNIFICANT CHANGE UP
MONOCYTES # BLD AUTO: 0.44 K/UL — SIGNIFICANT CHANGE UP (ref 0–0.9)
MONOCYTES NFR BLD AUTO: 7.4 % — SIGNIFICANT CHANGE UP (ref 2–14)
NEUTROPHILS # BLD AUTO: 3.59 K/UL — SIGNIFICANT CHANGE UP (ref 1.8–7.4)
NEUTROPHILS NFR BLD AUTO: 60.4 % — SIGNIFICANT CHANGE UP (ref 43–77)
NRBC # BLD AUTO: 0 K/UL — SIGNIFICANT CHANGE UP (ref 0–0)
NRBC # FLD: 0 K/UL — SIGNIFICANT CHANGE UP (ref 0–0)
NRBC BLD AUTO-RTO: 0 /100 WBCS — SIGNIFICANT CHANGE UP (ref 0–0)
PLAT MORPH BLD: NORMAL — SIGNIFICANT CHANGE UP
PLATELET # BLD AUTO: 69 K/UL — LOW (ref 150–400)
PMV BLD: 12.8 FL — SIGNIFICANT CHANGE UP (ref 7–13)
POLYCHROMASIA BLD QL SMEAR: SLIGHT — SIGNIFICANT CHANGE UP
RBC # BLD: 4.83 M/UL — SIGNIFICANT CHANGE UP (ref 4.2–5.8)
RBC # FLD: 13.6 % — SIGNIFICANT CHANGE UP (ref 10.3–14.5)
RBC BLD AUTO: SIGNIFICANT CHANGE UP
WBC # BLD: 5.95 K/UL — SIGNIFICANT CHANGE UP (ref 3.8–10.5)
WBC # FLD AUTO: 5.95 K/UL — SIGNIFICANT CHANGE UP (ref 3.8–10.5)

## 2025-04-17 PROCEDURE — 99214 OFFICE O/P EST MOD 30 MIN: CPT

## 2025-04-30 ENCOUNTER — RX RENEWAL (OUTPATIENT)
Age: 70
End: 2025-04-30

## 2025-06-01 NOTE — ASSESSMENT
[FreeTextEntry1] : ECG performed today at the office revealed sinus tachycardia 115 bpm, with normal AQRS, TN, QRS and QTc.\par  No

## 2025-06-11 ENCOUNTER — APPOINTMENT (OUTPATIENT)
Dept: CARDIOLOGY | Facility: CLINIC | Age: 70
End: 2025-06-11
Payer: MEDICARE

## 2025-06-11 VITALS
DIASTOLIC BLOOD PRESSURE: 80 MMHG | OXYGEN SATURATION: 95 % | HEIGHT: 72 IN | WEIGHT: 130 LBS | BODY MASS INDEX: 17.61 KG/M2 | HEART RATE: 77 BPM | SYSTOLIC BLOOD PRESSURE: 114 MMHG

## 2025-06-11 PROCEDURE — 99214 OFFICE O/P EST MOD 30 MIN: CPT | Mod: 25

## 2025-06-11 PROCEDURE — 93000 ELECTROCARDIOGRAM COMPLETE: CPT

## (undated) DEVICE — DRSG WEBRIL 6"

## (undated) DEVICE — ELCTR GROUNDING PAD ADULT COVIDIEN

## (undated) DEVICE — ELCTR STRYKER NEPTUNE SMOKE EVACUATION PENCIL (GREEN)

## (undated) DEVICE — VENODYNE/SCD SLEEVE CALF MEDIUM

## (undated) DEVICE — SUT ETHILON 3-0 18" PS-1

## (undated) DEVICE — WARMING BLANKET UPPER ADULT

## (undated) DEVICE — SUT VICRYL PLUS 0 27" CT-2 UNDYED

## (undated) DEVICE — DRAPE SPLIT SHEET 77" X 108"

## (undated) DEVICE — VALVE ENDO SURESEAL II 0-5FR

## (undated) DEVICE — DRAPE C ARM C-ARMOUR

## (undated) DEVICE — DRAPE MAYO STAND 23"

## (undated) DEVICE — PACK EXTREMITY

## (undated) DEVICE — SOL IRR POUR H2O 1000ML

## (undated) DEVICE — NDL HYPO REGULAR BEVEL 25G X 1.5" (BLUE)

## (undated) DEVICE — SUT MONOCRYL 3-0 27" PS-2 UNDYED

## (undated) DEVICE — SOL IRR POUR NS 0.9% 1000ML

## (undated) DEVICE — DRAPE C ARM MINI

## (undated) DEVICE — SUT MONOCRYL 2-0 27" SH UNDYED

## (undated) DEVICE — GLV 8 PROTEXIS (WHITE)

## (undated) DEVICE — DRAPE IOBAN 23" X 17"